# Patient Record
Sex: MALE | Race: BLACK OR AFRICAN AMERICAN | NOT HISPANIC OR LATINO | Employment: FULL TIME | URBAN - METROPOLITAN AREA
[De-identification: names, ages, dates, MRNs, and addresses within clinical notes are randomized per-mention and may not be internally consistent; named-entity substitution may affect disease eponyms.]

---

## 2021-01-22 ENCOUNTER — APPOINTMENT (OUTPATIENT)
Dept: GENERAL RADIOLOGY | Facility: CLINIC | Age: 41
End: 2021-01-22
Attending: INTERNAL MEDICINE
Payer: COMMERCIAL

## 2021-01-22 ENCOUNTER — HOSPITAL ENCOUNTER (EMERGENCY)
Facility: CLINIC | Age: 41
Discharge: HOME | End: 2021-01-23
Attending: INTERNAL MEDICINE | Admitting: INTERNAL MEDICINE
Payer: COMMERCIAL

## 2021-01-22 ENCOUNTER — NURSE TRIAGE (OUTPATIENT)
Dept: NURSING | Facility: CLINIC | Age: 41
End: 2021-01-22

## 2021-01-22 DIAGNOSIS — Z20.822 SUSPECTED COVID-19 VIRUS INFECTION: ICD-10-CM

## 2021-01-22 DIAGNOSIS — U07.1 PNEUMONIA DUE TO 2019 NOVEL CORONAVIRUS: ICD-10-CM

## 2021-01-22 DIAGNOSIS — J12.82 PNEUMONIA DUE TO 2019 NOVEL CORONAVIRUS: ICD-10-CM

## 2021-01-22 LAB
ALBUMIN SERPL-MCNC: 3.3 G/DL (ref 3.4–5)
ALP SERPL-CCNC: 100 U/L (ref 40–150)
ALT SERPL W P-5'-P-CCNC: 51 U/L (ref 0–70)
ANION GAP SERPL CALCULATED.3IONS-SCNC: 6 MMOL/L (ref 3–14)
AST SERPL W P-5'-P-CCNC: 63 U/L (ref 0–45)
BILIRUB SERPL-MCNC: 0.6 MG/DL (ref 0.2–1.3)
BUN SERPL-MCNC: 10 MG/DL (ref 7–30)
CALCIUM SERPL-MCNC: 8.6 MG/DL (ref 8.5–10.1)
CHLORIDE SERPL-SCNC: 101 MMOL/L (ref 94–109)
CO2 SERPL-SCNC: 29 MMOL/L (ref 20–32)
CREAT SERPL-MCNC: 1.12 MG/DL (ref 0.66–1.25)
CRP SERPL-MCNC: 36 MG/L (ref 0–8)
D DIMER PPP FEU-MCNC: 0.4 UG/ML FEU (ref 0–0.5)
GFR SERPL CREATININE-BSD FRML MDRD: 83 ML/MIN/{1.73_M2}
GLUCOSE SERPL-MCNC: 150 MG/DL (ref 70–99)
INR PPP: 1.03 (ref 0.86–1.14)
LACTATE BLD-SCNC: 2 MMOL/L (ref 0.7–2)
LIPASE SERPL-CCNC: 82 U/L (ref 73–393)
POTASSIUM SERPL-SCNC: 2.9 MMOL/L (ref 3.4–5.3)
PROT SERPL-MCNC: 7.2 G/DL (ref 6.8–8.8)
SODIUM SERPL-SCNC: 136 MMOL/L (ref 133–144)
TROPONIN I SERPL-MCNC: <0.015 UG/L (ref 0–0.04)

## 2021-01-22 PROCEDURE — C9803 HOPD COVID-19 SPEC COLLECT: HCPCS | Performed by: INTERNAL MEDICINE

## 2021-01-22 PROCEDURE — 83605 ASSAY OF LACTIC ACID: CPT | Performed by: INTERNAL MEDICINE

## 2021-01-22 PROCEDURE — 86140 C-REACTIVE PROTEIN: CPT | Performed by: INTERNAL MEDICINE

## 2021-01-22 PROCEDURE — 99285 EMERGENCY DEPT VISIT HI MDM: CPT | Mod: 25 | Performed by: INTERNAL MEDICINE

## 2021-01-22 PROCEDURE — 82728 ASSAY OF FERRITIN: CPT | Performed by: INTERNAL MEDICINE

## 2021-01-22 PROCEDURE — 93005 ELECTROCARDIOGRAM TRACING: CPT | Performed by: INTERNAL MEDICINE

## 2021-01-22 PROCEDURE — 84484 ASSAY OF TROPONIN QUANT: CPT | Performed by: INTERNAL MEDICINE

## 2021-01-22 PROCEDURE — 87636 SARSCOV2 & INF A&B AMP PRB: CPT | Performed by: INTERNAL MEDICINE

## 2021-01-22 PROCEDURE — 85379 FIBRIN DEGRADATION QUANT: CPT | Performed by: INTERNAL MEDICINE

## 2021-01-22 PROCEDURE — 80053 COMPREHEN METABOLIC PANEL: CPT | Performed by: INTERNAL MEDICINE

## 2021-01-22 PROCEDURE — 71045 X-RAY EXAM CHEST 1 VIEW: CPT

## 2021-01-22 PROCEDURE — 93010 ELECTROCARDIOGRAM REPORT: CPT | Performed by: INTERNAL MEDICINE

## 2021-01-22 PROCEDURE — 85025 COMPLETE CBC W/AUTO DIFF WBC: CPT | Performed by: INTERNAL MEDICINE

## 2021-01-22 PROCEDURE — 71045 X-RAY EXAM CHEST 1 VIEW: CPT | Mod: 26 | Performed by: RADIOLOGY

## 2021-01-22 PROCEDURE — 83690 ASSAY OF LIPASE: CPT | Performed by: INTERNAL MEDICINE

## 2021-01-22 PROCEDURE — 85610 PROTHROMBIN TIME: CPT | Performed by: INTERNAL MEDICINE

## 2021-01-22 RX ORDER — HYDROCHLOROTHIAZIDE 12.5 MG/1
TABLET ORAL DAILY
COMMUNITY
End: 2021-12-13

## 2021-01-22 RX ORDER — AMLODIPINE BESYLATE AND ATORVASTATIN CALCIUM 10; 10 MG/1; MG/1
1 TABLET, FILM COATED ORAL DAILY
COMMUNITY
End: 2021-12-16

## 2021-01-22 ASSESSMENT — ENCOUNTER SYMPTOMS
TROUBLE SWALLOWING: 0
SHORTNESS OF BREATH: 1
CHILLS: 1
COUGH: 1
FEVER: 1
ABDOMINAL PAIN: 0
NECK PAIN: 0
CONFUSION: 0
LIGHT-HEADEDNESS: 0
WHEEZING: 1
PALPITATIONS: 0
NAUSEA: 1
NUMBNESS: 0
VOMITING: 1
DIARRHEA: 0
ADENOPATHY: 0
HEADACHES: 0
BACK PAIN: 0
WEAKNESS: 0
DIFFICULTY URINATING: 0

## 2021-01-23 VITALS
RESPIRATION RATE: 22 BRPM | HEART RATE: 92 BPM | WEIGHT: 315 LBS | DIASTOLIC BLOOD PRESSURE: 69 MMHG | TEMPERATURE: 100.5 F | OXYGEN SATURATION: 94 % | SYSTOLIC BLOOD PRESSURE: 117 MMHG

## 2021-01-23 LAB
BASOPHILS # BLD AUTO: 0 10E9/L (ref 0–0.2)
BASOPHILS NFR BLD AUTO: 0.3 %
DIFFERENTIAL METHOD BLD: ABNORMAL
EOSINOPHIL # BLD AUTO: 0 10E9/L (ref 0–0.7)
EOSINOPHIL NFR BLD AUTO: 0 %
ERYTHROCYTE [DISTWIDTH] IN BLOOD BY AUTOMATED COUNT: 12.3 % (ref 10–15)
FERRITIN SERPL-MCNC: 280 NG/ML (ref 26–388)
FLUAV RNA RESP QL NAA+PROBE: NEGATIVE
FLUBV RNA RESP QL NAA+PROBE: NEGATIVE
HCT VFR BLD AUTO: 45.4 % (ref 40–53)
HGB BLD-MCNC: 14.6 G/DL (ref 13.3–17.7)
IMM GRANULOCYTES # BLD: 0 10E9/L (ref 0–0.4)
IMM GRANULOCYTES NFR BLD: 0.3 %
INTERPRETATION ECG - MUSE: NORMAL
LABORATORY COMMENT REPORT: ABNORMAL
LYMPHOCYTES # BLD AUTO: 0.9 10E9/L (ref 0.8–5.3)
LYMPHOCYTES NFR BLD AUTO: 24.3 %
MCH RBC QN AUTO: 26.5 PG (ref 26.5–33)
MCHC RBC AUTO-ENTMCNC: 32.2 G/DL (ref 31.5–36.5)
MCV RBC AUTO: 83 FL (ref 78–100)
MONOCYTES # BLD AUTO: 0.3 10E9/L (ref 0–1.3)
MONOCYTES NFR BLD AUTO: 9.4 %
NEUTROPHILS # BLD AUTO: 2.4 10E9/L (ref 1.6–8.3)
NEUTROPHILS NFR BLD AUTO: 65.7 %
NRBC # BLD AUTO: 0 10*3/UL
NRBC BLD AUTO-RTO: 0 /100
PLATELET # BLD AUTO: 169 10E9/L (ref 150–450)
PLATELET # BLD EST: ABNORMAL 10*3/UL
RBC # BLD AUTO: 5.5 10E12/L (ref 4.4–5.9)
RSV RNA SPEC QL NAA+PROBE: NEGATIVE
SARS-COV-2 RNA RESP QL NAA+PROBE: POSITIVE
SPECIMEN SOURCE: ABNORMAL
WBC # BLD AUTO: 3.6 10E9/L (ref 4–11)

## 2021-01-23 PROCEDURE — 96374 THER/PROPH/DIAG INJ IV PUSH: CPT | Performed by: INTERNAL MEDICINE

## 2021-01-23 PROCEDURE — 250N000011 HC RX IP 250 OP 636: Performed by: INTERNAL MEDICINE

## 2021-01-23 PROCEDURE — 258N000003 HC RX IP 258 OP 636: Performed by: INTERNAL MEDICINE

## 2021-01-23 PROCEDURE — 96361 HYDRATE IV INFUSION ADD-ON: CPT | Performed by: INTERNAL MEDICINE

## 2021-01-23 PROCEDURE — 250N000013 HC RX MED GY IP 250 OP 250 PS 637: Performed by: INTERNAL MEDICINE

## 2021-01-23 RX ORDER — ONDANSETRON 4 MG/1
4 TABLET, ORALLY DISINTEGRATING ORAL EVERY 8 HOURS PRN
Qty: 10 TABLET | Refills: 0 | Status: SHIPPED | OUTPATIENT
Start: 2021-01-23 | End: 2021-01-26

## 2021-01-23 RX ORDER — ONDANSETRON 2 MG/ML
4 INJECTION INTRAMUSCULAR; INTRAVENOUS ONCE
Status: COMPLETED | OUTPATIENT
Start: 2021-01-23 | End: 2021-01-23

## 2021-01-23 RX ORDER — ALBUTEROL SULFATE 90 UG/1
2 AEROSOL, METERED RESPIRATORY (INHALATION) EVERY 6 HOURS PRN
Qty: 1 INHALER | Refills: 0 | Status: SHIPPED | OUTPATIENT
Start: 2021-01-23 | End: 2022-11-09

## 2021-01-23 RX ORDER — SODIUM CHLORIDE 9 MG/ML
INJECTION, SOLUTION INTRAVENOUS CONTINUOUS
Status: DISCONTINUED | OUTPATIENT
Start: 2021-01-23 | End: 2021-01-23 | Stop reason: HOSPADM

## 2021-01-23 RX ORDER — POTASSIUM CHLORIDE 1.5 G/1.58G
20 POWDER, FOR SOLUTION ORAL ONCE
Status: COMPLETED | OUTPATIENT
Start: 2021-01-23 | End: 2021-01-23

## 2021-01-23 RX ADMIN — SODIUM CHLORIDE 1000 ML: 9 INJECTION, SOLUTION INTRAVENOUS at 02:01

## 2021-01-23 RX ADMIN — ONDANSETRON 4 MG: 2 INJECTION INTRAMUSCULAR; INTRAVENOUS at 02:02

## 2021-01-23 RX ADMIN — POTASSIUM CHLORIDE 20 MEQ: 1.5 POWDER, FOR SOLUTION ORAL at 01:56

## 2021-01-23 NOTE — DISCHARGE INSTRUCTIONS
Albuterol 2 puff 4 times/ day as needed for cough, wheezing.  Zofran 4 mg every 8 hours as needed for nausea.  Check your pulse oxygen at least 4 times/ day.   You will be contacted for outpatient follow up.   Return if your oxygen is less than 90%, you have chest pain, worsening shortness of breath or worsening illness.

## 2021-01-23 NOTE — TELEPHONE ENCOUNTER
Call received from spouse, Praveena You  Verbal consent from pt to speak to spouse.  Pt is present and speaking via speaker phone    Lionel has tested positive for Covid-19  Per Prvaeena, they were advised by another nurse line that he should go in to the ER due to reports of trouble breathing.    She wants to know what the protocol is for going to the ER.  She states the [facility] instructed them to ask about Covid protocols before seeking medical care with ER/EMS/HCP    Praveena thought that she was calling directly to the ER. Notified that I am not at the ER/hospital/clinic    Advised - Lionel can present to the ER, notify staff of difficulty breathing and of positive Covid-19 status.      Genet Lee RN  [facility]      Additional Information    Health Information question, no triage required and triager able to answer question    Protocols used: INFORMATION ONLY CALL-A-AH

## 2021-01-23 NOTE — ED PROVIDER NOTES
ED Provider Note  [facility]      History     Chief Complaint   Patient presents with     Shortness of Breath     Nausea & Vomiting     HPI  Lionel Espitia is a 40 year old male who presents with increasing shortness of breath. He had a positive Covid test on 1/14 (result not available in Epic). He presents with worsened shortness of breath, nausea and dyspnea over the past 2 days. He has had non productive cough for about 7-10 days. He had initial negative test at a community clinic, then had positive Covid saliva test on 1/14. He has non productive cough and post tussive emesis. He has had low grade fevers and chills. He has had a couple of episodes of nausea and vomiting without coughing. He has no chest pain, abdominal pain, diarrhea, leg pain or swelling.    He has a history of morbid obesity, asthma, hypertension,  shunt, impaired fasting blood sugar and cellulitis in his legs.    Past Medical History:   Diagnosis Date     Hypertension        History reviewed. No pertinent surgical history.    History reviewed. No pertinent family history.    Social History     Tobacco Use     Smoking status: Never Smoker     Smokeless tobacco: Never Used   Substance Use Topics     Alcohol use: Not Currently          Review of Systems   Constitutional: Positive for chills and fever.   HENT: Positive for congestion. Negative for trouble swallowing.    Eyes: Negative for visual disturbance.   Respiratory: Positive for cough, shortness of breath and wheezing.    Cardiovascular: Negative for chest pain, palpitations and leg swelling.   Gastrointestinal: Positive for nausea and vomiting. Negative for abdominal pain and diarrhea.   Genitourinary: Negative for difficulty urinating.   Musculoskeletal: Negative for back pain and neck pain.   Skin: Negative for rash.   Neurological: Negative for weakness, light-headedness, numbness and headaches.   Hematological: Negative for adenopathy.    Psychiatric/Behavioral: Negative for confusion.         Physical Exam   BP: 133/89  Pulse: 116  Temp: 100.5  F (38.1  C)  Resp: 22  Weight: (!) 156.5 kg (345 lb)  SpO2: 96 %  Physical Exam  Vitals signs and nursing note reviewed.   Constitutional:       Appearance: He is well-developed.   HENT:      Head: Normocephalic and atraumatic.      Mouth/Throat:      Mouth: Mucous membranes are moist.   Eyes:      Extraocular Movements: Extraocular movements intact.      Pupils: Pupils are equal, round, and reactive to light.   Neck:      Musculoskeletal: Normal range of motion and neck supple.   Cardiovascular:      Rate and Rhythm: Normal rate and regular rhythm.      Heart sounds: No murmur.   Pulmonary:      Effort: Pulmonary effort is normal.      Breath sounds: Normal breath sounds.   Abdominal:      General: Abdomen is flat.      Palpations: Abdomen is soft.      Tenderness: There is no abdominal tenderness. There is no guarding.   Musculoskeletal: Normal range of motion.         General: No swelling or tenderness.      Right lower leg: No edema.      Left lower leg: No edema.   Skin:     General: Skin is warm and dry.   Neurological:      General: No focal deficit present.      Mental Status: He is alert and oriented to person, place, and time.   Psychiatric:         Mood and Affect: Mood normal.         Behavior: Behavior normal.         Thought Content: Thought content normal.       ED Course      Procedures             EKG Interpretation:      Interpreted by APARNA HOOD MD  Time reviewed:2303  Symptoms at time of EKG: None   Rhythm: Normal sinus   Rate: Normal  Axis: Normal  Ectopy: None  Conduction: Normal and Right bundle branch block (complete)  ST Segments/ T Waves: Non-specific ST-T wave changes  Q Waves: None  Comparison to prior: No old EKG available    Clinical Impression: right bundle branch block      Labs/Imaging    Results for orders placed or performed during the hospital encounter of 01/22/21  (from the past 24 hour(s))   XR Chest Port 1 View    Narrative    EXAM: XR CHEST PORT 1 VW  LOCATION: [facility]  DATE/TIME: 1/22/2021 10:56 PM    INDICATION: Covid, dyspnea  COMPARISON: None.      Impression    IMPRESSION: Slight left greater than right basilar groundglass opacity, may reflect atelectasis versus consolidation, recommend follow-up to clearing. No pneumothorax or pleural effusion. Cardiac silhouette within normal limits.   CBC with platelets differential   Result Value Ref Range    WBC 3.6 (L) 4.0 - 11.0 10e9/L    RBC Count 5.50 4.4 - 5.9 10e12/L    Hemoglobin 14.6 13.3 - 17.7 g/dL    Hematocrit 45.4 40.0 - 53.0 %    MCV 83 78 - 100 fl    MCH 26.5 26.5 - 33.0 pg    MCHC 32.2 31.5 - 36.5 g/dL    RDW 12.3 10.0 - 15.0 %    Platelet Count 169 150 - 450 10e9/L    Diff Method PENDING    INR   Result Value Ref Range    INR 1.03 0.86 - 1.14   D dimer quantitative   Result Value Ref Range    D Dimer 0.4 0.0 - 0.50 ug/ml FEU   Comprehensive metabolic panel   Result Value Ref Range    Sodium 136 133 - 144 mmol/L    Potassium 2.9 (L) 3.4 - 5.3 mmol/L    Chloride 101 94 - 109 mmol/L    Carbon Dioxide 29 20 - 32 mmol/L    Anion Gap 6 3 - 14 mmol/L    Glucose 150 (H) 70 - 99 mg/dL    Urea Nitrogen 10 7 - 30 mg/dL    Creatinine 1.12 0.66 - 1.25 mg/dL    GFR Estimate 83 >60 mL/min/[1.73_m2]    GFR Estimate If Black >90 >60 mL/min/[1.73_m2]    Calcium 8.6 8.5 - 10.1 mg/dL    Bilirubin Total 0.6 0.2 - 1.3 mg/dL    Albumin 3.3 (L) 3.4 - 5.0 g/dL    Protein Total 7.2 6.8 - 8.8 g/dL    Alkaline Phosphatase 100 40 - 150 U/L    ALT 51 0 - 70 U/L    AST 63 (H) 0 - 45 U/L   Lipase   Result Value Ref Range    Lipase 82 73 - 393 U/L   Lactic acid whole blood   Result Value Ref Range    Lactic Acid 2.0 0.7 - 2.0 mmol/L   CRP inflammation   Result Value Ref Range    CRP Inflammation 36.0 (H) 0.0 - 8.0 mg/L   Troponin I   Result Value Ref Range    Troponin I ES <0.015 0.000 - 0.045 ug/L   Ferritin   Result Value Ref  Range    Ferritin 280 26 - 388 ng/mL   Symptomatic Influenza A/B & SARS-CoV2 (COVID-19) Virus PCR Multiplex    Specimen: Nasopharyngeal   Result Value Ref Range    Flu A/B & SARS-COV-2 PCR Source Nasopharyngeal     SARS-CoV-2 PCR Result POSITIVE (AA)     Influenza A PCR Negative NEG^Negative    Influenza B PCR Negative NEG^Negative    Respiratory Syncytial Virus PCR Negative NEG^Negative    Flu A/B & SARS-CoV-2 PCR Comment (Note)        Medications   0.9% sodium chloride BOLUS (1,000 mLs Intravenous New Bag 1/23/21 0201)     Followed by   sodium chloride 0.9% infusion (has no administration in time range)   potassium chloride (KLOR-CON) Packet 20 mEq (20 mEq Oral Given 1/23/21 0156)   ondansetron (ZOFRAN) injection 4 mg (4 mg Intravenous Given 1/23/21 0202)        Assessments & Plan (with Medical Decision Making)   Impression:  Young male with a history of hypertension, obesity and mild asthma presents with Covid-19 symptoms for the past 10 days. He has normal oxygen saturation on room air. Fevers have not been severe. He has non productive cough, but is in no respiratory distress. He has no wheezing on exam. CXR has mild bibasilar ground glass infiltrates consistent with Covid-19. He has mild leukopenia. CRP is moderately elevated. Lactate and ferritin are normal. He has mild hypokalemia and mild elevation of AST. At this point he does not appear to meet criteria for admission, treatment with steroids or remdesivir. He does not need antibiotics. He was given some IV hydration and will be discharged with albuterol inhaler, zofran and home pulse oximetry monitoring through the [facility].    I have reviewed the nursing notes. I have reviewed the findings, diagnosis, plan and need for follow up with the patient.    New Prescriptions    ALBUTEROL (PROAIR HFA/PROVENTIL HFA/VENTOLIN HFA) 108 (90 BASE) MCG/ACT INHALER    Inhale 2 puffs into the lungs every 6 hours as needed for shortness of breath / dyspnea or wheezing     ONDANSETRON (ZOFRAN ODT) 4 MG ODT TAB    Take 1 tablet (4 mg) by mouth every 8 hours as needed for nausea or vomiting       Final diagnoses:   Pneumonia due to 2019 novel coronavirus   Suspected COVID-19 virus infection       --  Alex Grossman  [facility] EMERGENCY DEPARTMENT  1/22/2021     Alex Grossman MD  01/23/21 9743

## 2021-01-23 NOTE — ED TRIAGE NOTES
Had a positive COVID test on the 1/14/21. SOB has been growing worse over last week, last two days pt has had increasing work of breathing, cough, and nausea.

## 2021-01-24 ENCOUNTER — PATIENT OUTREACH (OUTPATIENT)
Dept: CARE COORDINATION | Facility: CLINIC | Age: 41
End: 2021-01-24

## 2021-01-24 NOTE — TELEPHONE ENCOUNTER
Care Coordination ED Discharge Follow up Note-    Patient referred for Virtual Home Monitoring Program for COVID-19. Called patient to complete assessment, verify or assist with scheduling follow up appointment with patient's PCP, offer Clinic Care Coordination services and ensure patient is engaged with the Get[employer] Loop. Called patient and left detailed message stating a nurse will try him again tomorrow.

## 2021-01-25 ENCOUNTER — PATIENT OUTREACH (OUTPATIENT)
Dept: CARE COORDINATION | Facility: CLINIC | Age: 41
End: 2021-01-25

## 2021-01-25 NOTE — PROGRESS NOTES
Care Coordination ED Discharge Follow up Note  Pt on home virtual monitoring program for COVID-19, call made to do assessment, verify follow-up appt and offer care coordination, no answer. Left message asking pt to make appt for virtual visit in the next one to two days and to accept the invitation to participate in [facility] Loop. Also left message to eat K+ rich foods and suggestions given.

## 2021-01-27 ENCOUNTER — OFFICE VISIT - HEALTHEAST (OUTPATIENT)
Dept: FAMILY MEDICINE | Facility: CLINIC | Age: 41
End: 2021-01-27

## 2021-01-27 DIAGNOSIS — U07.1 COVID-19: ICD-10-CM

## 2021-01-27 ASSESSMENT — PATIENT HEALTH QUESTIONNAIRE - PHQ9: SUM OF ALL RESPONSES TO PHQ QUESTIONS 1-9: 0

## 2021-03-09 ENCOUNTER — OFFICE VISIT - HEALTHEAST (OUTPATIENT)
Dept: FAMILY MEDICINE | Facility: CLINIC | Age: 41
End: 2021-03-09

## 2021-03-09 DIAGNOSIS — E66.01 MORBID OBESITY (H): ICD-10-CM

## 2021-03-09 DIAGNOSIS — D33.2 BENIGN NEOPLASM OF BRAIN, UNSPECIFIED BRAIN REGION (H): ICD-10-CM

## 2021-03-09 DIAGNOSIS — R53.83 FATIGUE, UNSPECIFIED TYPE: ICD-10-CM

## 2021-03-09 DIAGNOSIS — R73.01 IMPAIRED FASTING BLOOD SUGAR: ICD-10-CM

## 2021-03-09 DIAGNOSIS — I10 ESSENTIAL HYPERTENSION: ICD-10-CM

## 2021-03-09 LAB
ALBUMIN UR-MCNC: NEGATIVE G/DL
APPEARANCE UR: CLEAR
BILIRUB UR QL STRIP: NEGATIVE
COLOR UR AUTO: YELLOW
GLUCOSE UR STRIP-MCNC: NEGATIVE MG/DL
HGB UR QL STRIP: NEGATIVE
KETONES UR STRIP-MCNC: NEGATIVE MG/DL
LEUKOCYTE ESTERASE UR QL STRIP: NEGATIVE
NITRATE UR QL: NEGATIVE
PH UR STRIP: 6.5 [PH] (ref 5–8)
SP GR UR STRIP: 1.02 (ref 1–1.03)
UROBILINOGEN UR STRIP-ACNC: NORMAL

## 2021-03-09 ASSESSMENT — MIFFLIN-ST. JEOR: SCORE: 2535.39

## 2021-03-10 ENCOUNTER — AMBULATORY - HEALTHEAST (OUTPATIENT)
Dept: LAB | Facility: CLINIC | Age: 41
End: 2021-03-10

## 2021-03-10 DIAGNOSIS — R53.83 FATIGUE, UNSPECIFIED TYPE: ICD-10-CM

## 2021-03-10 DIAGNOSIS — E66.01 MORBID OBESITY (H): ICD-10-CM

## 2021-03-10 DIAGNOSIS — I10 ESSENTIAL HYPERTENSION: ICD-10-CM

## 2021-03-10 DIAGNOSIS — R73.01 IMPAIRED FASTING BLOOD SUGAR: ICD-10-CM

## 2021-03-10 LAB
ANION GAP SERPL CALCULATED.3IONS-SCNC: 9 MMOL/L (ref 5–18)
BUN SERPL-MCNC: 10 MG/DL (ref 8–22)
CALCIUM SERPL-MCNC: 9.6 MG/DL (ref 8.5–10.5)
CHLORIDE BLD-SCNC: 103 MMOL/L (ref 98–107)
CHOLEST SERPL-MCNC: 158 MG/DL
CO2 SERPL-SCNC: 29 MMOL/L (ref 22–31)
CREAT SERPL-MCNC: 0.95 MG/DL (ref 0.7–1.3)
ERYTHROCYTE [DISTWIDTH] IN BLOOD BY AUTOMATED COUNT: 12.9 % (ref 11–14.5)
FASTING STATUS PATIENT QL REPORTED: YES
GFR SERPL CREATININE-BSD FRML MDRD: >60 ML/MIN/1.73M2
GLUCOSE BLD-MCNC: 102 MG/DL (ref 70–125)
HBA1C MFR BLD: 6 %
HCT VFR BLD AUTO: 43.1 % (ref 40–54)
HDLC SERPL-MCNC: 45 MG/DL
HGB BLD-MCNC: 13.9 G/DL (ref 14–18)
LDLC SERPL CALC-MCNC: 102 MG/DL
MCH RBC QN AUTO: 27 PG (ref 27–34)
MCHC RBC AUTO-ENTMCNC: 32.3 G/DL (ref 32–36)
MCV RBC AUTO: 84 FL (ref 80–100)
PLATELET # BLD AUTO: 248 THOU/UL (ref 140–440)
PMV BLD AUTO: 9.9 FL (ref 7–10)
POTASSIUM BLD-SCNC: 4 MMOL/L (ref 3.5–5)
RBC # BLD AUTO: 5.15 MILL/UL (ref 4.4–6.2)
SODIUM SERPL-SCNC: 141 MMOL/L (ref 136–145)
TRIGL SERPL-MCNC: 54 MG/DL
TSH SERPL DL<=0.005 MIU/L-ACNC: 1.27 UIU/ML (ref 0.3–5)
VIT B12 SERPL-MCNC: 1015 PG/ML (ref 213–816)
WBC: 3.3 THOU/UL (ref 4–11)

## 2021-05-27 ASSESSMENT — PATIENT HEALTH QUESTIONNAIRE - PHQ9: SUM OF ALL RESPONSES TO PHQ QUESTIONS 1-9: 0

## 2021-06-05 VITALS
WEIGHT: 315 LBS | BODY MASS INDEX: 41.75 KG/M2 | SYSTOLIC BLOOD PRESSURE: 123 MMHG | HEART RATE: 98 BPM | HEIGHT: 73 IN | DIASTOLIC BLOOD PRESSURE: 86 MMHG

## 2021-06-08 ENCOUNTER — RECORDS - HEALTHEAST (OUTPATIENT)
Dept: GENERAL RADIOLOGY | Facility: CLINIC | Age: 41
End: 2021-06-08

## 2021-06-08 ENCOUNTER — OFFICE VISIT - HEALTHEAST (OUTPATIENT)
Dept: FAMILY MEDICINE | Facility: CLINIC | Age: 41
End: 2021-06-08

## 2021-06-08 DIAGNOSIS — D33.2 BENIGN NEOPLASM OF BRAIN, UNSPECIFIED BRAIN REGION (H): ICD-10-CM

## 2021-06-08 DIAGNOSIS — M25.571 PAIN IN RIGHT ANKLE AND JOINTS OF RIGHT FOOT: ICD-10-CM

## 2021-06-08 DIAGNOSIS — G25.9 LESION OF BASAL GANGLIA: ICD-10-CM

## 2021-06-08 DIAGNOSIS — M25.571 PAIN IN JOINT INVOLVING ANKLE AND FOOT, RIGHT: ICD-10-CM

## 2021-06-08 DIAGNOSIS — E66.01 MORBID OBESITY (H): ICD-10-CM

## 2021-06-10 ENCOUNTER — OFFICE VISIT - HEALTHEAST (OUTPATIENT)
Dept: PHYSICAL THERAPY | Facility: REHABILITATION | Age: 41
End: 2021-06-10

## 2021-06-10 DIAGNOSIS — M25.671 ANKLE STIFFNESS, RIGHT: ICD-10-CM

## 2021-06-10 DIAGNOSIS — R26.2 DIFFICULTY WALKING: ICD-10-CM

## 2021-06-10 DIAGNOSIS — M25.571 ACUTE RIGHT ANKLE PAIN: ICD-10-CM

## 2021-06-11 ENCOUNTER — TRANSCRIBE ORDERS (OUTPATIENT)
Dept: OTHER | Age: 41
End: 2021-06-11

## 2021-06-11 DIAGNOSIS — D33.2 BENIGN NEOPLASM OF BRAIN, UNSPECIFIED BRAIN REGION (H): Primary | ICD-10-CM

## 2021-06-11 DIAGNOSIS — G25.9 LESION OF BASAL GANGLIA: ICD-10-CM

## 2021-06-14 NOTE — PROGRESS NOTES
Lionel Espitia Jr. is a 40 y.o. male who is being evaluated via a billable telephone visit.      What phone number would you like to be contacted at? [phone]   How would you like to obtain your AVS? AVS Preference: Cynthia.     Assessment & Plan     COVID-19  Discussed clinical course.  This is more protracted, but I am hopeful that he is turning a corner.  Continue to watch sats and let seek care if <90.  Can try Tessalon for cough, but discussed that cough is hard to treat.  Can continue NSAIDs.  I don't think he needs anti coag or further imaging at this time.  Would consider if he worsens.    - benzonatate (TESSALON PERLES) 100 MG capsule  Dispense: 30 capsule; Refill: 0      Return in about 1 month (around 2/27/2021) for Annual physical, Recheck.    Tashi Sin MD  [facility]    Subjective     Lionel Espitia Jr. is 40 y.o. and presents to clinic today for the following health issues   HPI   Lingering effects from COVID.  Tested + on 1/14/2021  ER note from 1/22 [facility] reviewed.  CXR with bilateral GGO.  D-dimer normal.  Low K+    Had IV fluids, K+ replacement, ondansetron.    Generally a little better.    Some shortness of breath with walking around house.  Sats remain in mid 90s.    Stomach pain  Lower appetite  No chest pain  From coughing--taking ibuprofen and tylenol  Doesn't feel like he gets much assist from either.    Taste and smell have always been normal.  No myalgia or arthralgia.    Hard to rest.  Two young children at home.    Objective       Vitals:  No vitals were obtained today due to virtual visit.      Start: 10:07  End: 10:30    Phone call duration: 23 minutes

## 2021-06-15 NOTE — PATIENT INSTRUCTIONS - HE
Lionel,  Nice to see you, today.  Dr Peña is the neurosurgeon at the  that I was talking about.  You should have the phone # to their office in the mychart summary of this visit.    We'll see you for labs tomorrow.  Dr NICK

## 2021-06-15 NOTE — PROGRESS NOTES
Adult Physical:    CC:  cpe    HPI:  40 y.o.   Low energy.  Always tired.  COVID side effect?  Has nearly 1 yo--not sleeping well.    Six hours per night is his usual.  Snores.  Not falling asleep watching TV or at stop lights.    Some testicular pain.  Hx vasectomy.  L>R.  No discharge.  Stable marriage low risk for STI.    Occasional odd urine flow.  Maybe incomplete emptying  Sometimes feels he needs to go back but gets only drops out.  Not painful.    Patient Active Problem List   Diagnosis     Morbid obesity (H)     Primary insomnia     Brain tumor (benign) (H)     Essential hypertension     Impaired fasting blood sugar     Lesion of basal ganglia     Last MRI 2017 [facility].  Has not had much for follow up.    Past Medical History:  History reviewed. No pertinent past medical history.      Brain tumor (benign) ([facility]) 5/14/1993   Brain surgery in the past and  shunt     Past Surgical History:  Past Surgical History:   Procedure Laterality Date     VASECTOMY       VENTRICULOPERITONEAL SHUNT  05/14/1993     Medicines:  Error in meds  Never on atorvastatin  Just amlodipine.  Outpatient Medications Prior to Visit   Medication Sig Dispense Refill     albuterol (VENTOLIN HFA) 90 mcg/actuation inhaler Ventolin HFA 90 mcg/actuation aerosol inhaler       hydroCHLOROthiazide (HYDRODIURIL) 25 MG tablet hydrochlorothiazide 25 mg tablet       amLODIPine-atorvastatin (CADUET) 10-10 mg per tablet Take 1 tablet by mouth. Take 1 tablet by mouth.       benzonatate (TESSALON PERLES) 100 MG capsule Take 1 capsule (100 mg total) by mouth 3 (three) times a day as needed for cough. 30 capsule 0     No facility-administered medications prior to visit.        Allergies:  Allergies   Allergen Reactions     Lisinopril Cough and Other (See Comments)     Coughing          Family History:  No family history on file.    Social History:  Social History     Socioeconomic History     Marital status:      Spouse name: Not on file  "    Number of children: Not on file     Years of education: Not on file     Highest education level: Not on file   Occupational History     Not on file   Social Needs     Financial resource strain: Not on file     Food insecurity     Worry: Not on file     Inability: Not on file     Transportation needs     Medical: Not on file     Non-medical: Not on file   Tobacco Use     Smoking status: Never Smoker     Smokeless tobacco: Never Used   Substance and Sexual Activity     Alcohol use: Not on file     Drug use: Not on file     Sexual activity: Not on file   Lifestyle     Physical activity     Days per week: Not on file     Minutes per session: Not on file     Stress: Not on file   Relationships     Social connections     Talks on phone: Not on file     Gets together: Not on file     Attends [employer] service: Not on file     Active member of club or organization: Not on file     Attends meetings of clubs or organizations: Not on file     Relationship status: Not on file     Intimate partner violence     Fear of current or ex partner: Not on file     Emotionally abused: Not on file     Physically abused: Not on file     Forced sexual activity: Not on file   Other Topics Concern     Not on file   Social History Narrative     Not on file     Review of Systems:  See HPI for positives    GEN: no fevers or chills   ENT: no sinus concerns, normal hearing and vision   RESP: no cough or wheezing   CV: no dyspnea, palpitations, edema or chest pain   GI: no nausea, vomiting, diarrhea, or constipation   : no dysuria or frequency   ENDO: no hot or cold intolerance, no polydipsia or polyuria   MS: no myalgias or arthralgias   DERM: no rash or bruising   PSYCH: no depression concerns     Physical Exam:  /86 (Patient Site: Right Arm, Patient Position: Sitting, Cuff Size: Thigh)   Pulse 98   Ht 6' 0.5\" (1.842 m)   Wt (!) 346 lb (156.9 kg)   BMI 46.28 kg/m    Gen:   Alert, not distressed  Head:   Normocephalic, without " obvious abnormality, atraumatic  Eyes:   PERRL, conjunctiva/corneas clear, EOM's intact  Ears:   Normal tympanic membranes and external ear canals  Nose:    Mucosa normal, no drainage or sinus tenderness  Throat:    No erythema or exudates  Neck:    No adenopathy no nodules in thyroid, normal ROM  Lungs:    Clear to auscultation bilaterally, respirations unlabored  Chest wall:  No tenderness or deformity  Heart:     Regular, normal S1 and S2, no murmur, gallop or rub  Abdomen:  Soft, non-tender, no masses, no organomegaly  Back:    Symmetric, no curvature, ROM normal, no CVA tenderness  Genitalia:    circumcisec phallus, no tenderness left vas and neurovascular bundle, no mass.  Extremities: Extremities normal, atraumatic, no cyanosis or edema  Skin:   Skin color, texture, turgor normal, no rashes or lesions  Lymph nodes: Cervical, and supraclavicular, nodes normal  Neurologic: CNII-XII intact.   DTRs normal and symmetric.  Symmetric strength and sensation.    Immunizations Due:  FLU: he declined.    Tobacco Cessation:   n/a    Weight management:   The patient was counseled regarding nutrition and physical activity    Assessment and Plan:  1. Essential hypertension  Controlled, continue meds HCTZ and amlodipine  - amLODIPine (NORVASC) 10 MG tablet; Take 1 tablet (10 mg total) by mouth daily.  Dispense: 90 tablet; Refill: 0  - Urinalysis  - Basic Metabolic Panel; Future    2. Benign neoplasm of brain, unspecified brain region (H)  Has not had much for follow up.  recommend involving NS for at least a check in.   shunt seems to be functioning well  - Ambulatory referral to Neurosurgery - [facility] Vivian Cole ([place])    3. Impaired fasting blood sugar  - Glycosylated Hemoglobin A1c; Future    4. Fatigue, unspecified type  Consider MANAN, as he doesn't wake refreshed.  primary lack of sleep time due to parenthood  Other metabolic conditions.  - Thyroid [place]; Future  - HM2(CBC w/o Differential); Future  -  Vitamin B12; Future    5. Morbid obesity (H)  Exercise and weight loss encrouaged  - Lipid [place] FASTING; Future

## 2021-06-16 PROBLEM — F51.01 PRIMARY INSOMNIA: Status: ACTIVE | Noted: 2017-12-06

## 2021-06-16 PROBLEM — G25.9: Status: ACTIVE | Noted: 2017-12-06

## 2021-06-17 ENCOUNTER — TELEPHONE (OUTPATIENT)
Dept: NEUROSURGERY | Facility: CLINIC | Age: 41
End: 2021-06-17

## 2021-06-18 NOTE — PATIENT INSTRUCTIONS - HE
Patient Instructions by Tashi Sin MD at 1/27/2021  9:40 AM     Author: Tashi Sin MD Service: -- Author Type: Physician    Filed: 1/27/2021 11:01 AM Encounter Date: 1/27/2021 Status: Signed    : Tashi Sin MD (Physician)       Patient Education     Coronavirus Disease 2019 (COVID-19): Caring for Yourself or Others   If you or a household member have symptoms of COVID-19, follow the guidelines below. This will help you manage symptoms and keep the virus from spreading.  If you have symptoms of COVID-19    Stay home and contact your care team. They will tell you what to do. You may be told to stay home and away from others (self-isolate). You may also be told to stay at least 6 feet from others (social distancing).    Stay away from work, school, and public places.    Limit physical contact with others in your home. Limit visitors. No kissing.  Clean surfaces you touch with disinfectant.  If you need to cough or sneeze, do it into a tissue. Then throw the tissue into the trash. If you don't have tissues, cough or sneeze into the bend of your elbow.  Dont share food or personal items with people in your household. This includes items like eating and drinking utensils, towels, and bedding.  Wear a cloth face mask around other people. During a public health emergency, medical face masks may be reserved for healthcare workers. You may need to make a cloth face mask of your own. You can do this using a bandana, T-shirt, or other cloth. The CDC has instructions on how to make a face mask. Wear the mask so that it covers both your nose and mouth.  If you need to go to a hospital or clinic, call ahead to let them know. Expect the care team to wear masks, gowns, gloves, and eye protection. You may need to come to a different entrance or wait in a separate room.  Follow all instructions from your care team.    If youve been diagnosed with COVID-19    Stay home and away from others, including other  people in your home. (This is called self-isolation.) Dont leave home unless you need to get medical care. Dont go to work, school, or public places. Dont use buses, taxis, or other public transportation.    Follow all instructions from your care team.    If you need to go to a hospital or clinic, call ahead to let them know. Expect the care team to wear masks, gowns, gloves, and eye protection. You may need to come to a different entrance or wait in a separate room.    Wear a face mask over your nose and mouth. This is to protect others from your germs. If you cant wear a mask, your caregivers should wear one. You may need to make your own mask using a bandana, T-shirt, or other cloth. See the [facility] instructions on how to make a face mask.    Have no contact with pets and other animals.    Dont share food or personal items with people in your household. This includes items like eating and drinking utensils, towels, and bedding.    If you need to cough or sneeze, do it into a tissue. Then throw the tissue into the trash. If you don't have tissues, cough or sneeze into the bend of your elbow.    Wash your hands often.    Self-care at home  The FDA has approved an antiviral medicine (called remdesivir) for people being treated in the hospital. This is for people 12 years and older who weigh more than about 88 pounds (40 kgs). In certain cases, it may also be used for people younger than 12 years or who weigh less than about 88 pounds (40 kgs)..  Currently, treatment is mostly aimed at helping your body while it fights the virus.    Getting extra rest.    Drink extra fluids 6 to 8 glasses of liquids each day), unless a doctor has told you not to. Ask your care team which fluids are best for you. Avoid fluids that contain caffeine or alcohol.    Taking over-the-counter (OTC) medicine to reduce pain and fever. Your care team will tell you which medicine to use.  If youve been in the hospital for COVID-19, follow your care  teams instructions. They will tell you when to stop self-isolation. They may also have you change positions to help your breathing (such as lying on your belly).  If a test showed that you have COVID-19, you may be asked to donate plasma after youve recovered. (This is called COVID-19 convalescent plasma donation.) The plasma may contain antibodies to help fight the virus in others. Experts don't know the safety of plasma or how well it works. Research continues, and the FDA has approved it for emergency use in certain people with serious or life-threatening COVID-19.  Caring for a sick person     Follow all instructions from the care team.    Wash your hands often.    Wear protective clothing as advised.    Make sure the sick person wears a mask. If they can't wear a mask, dont stay in the same room with them. If you must be in the same room, wear a face mask. Make sure the mask covers both the nose and mouth.    Keep track of the sick persons symptoms.    Clean surfaces often with disinfectant. This includes phones, kitchen counters, fridge door handles, bathroom surfaces, and others.    Dont let anyone share household items with the sick person. This includes eating and drinking tools, towels, sheets, or blankets.    Clean fabrics and laundry well.    Keep other people and pets away from the sick person.    When you can stop self-isolation  When you are sick with COVID-19, you should stay away from other people. This is called self-isolation. The rules for ending self-isolation depend on your health in general.  If you are normally healthy:  You can stop self-isolation when all 3 of these are true:    Youve had no fever--and no medicine that reduces fever--for at least 24 hours. And?    Your symptoms are better, such as cough or trouble breathing. And?    At least 10 days have passed since your symptoms first started.  Talk with your care team before you leave home. They may tell you its okay to leave, or they  may give you different advice. You do not need to be re-tested.  If you have a weak immune system, or youve had severe COVID-19:  Follow your care teams instructions. You may be asked to self-isolate for 10 days to 20 days after your symptoms first started. Your care team may want to re-test you for COVID-19.  Note: If youre being treated for cancer, have an immune disorder (such as HIV), or have had a transplant (organ or bone marrow), you may have a weak immune system.  If you've already had COVID-19 once:  If you had the virus over 3 months ago, and youve been exposed again, treat it like you've never had COVID-19. Stay home, limit your contact with others, call your care team, and watch for symptoms.  If its been less than 3 months since you had the virus, youre symptom-free, and youve been exposed again: You dont need to self-isolate. You dont need to be re-tested, unless you have new symptoms of COVID-19 that cant be linked to another illness. But if you develop new symptoms, stay home. Contact your care team if you have questions.  When you return to public settings  When you are well enough to go outside your home, follow the CDCs guidance on cloth face masks.    Anyone over age 2 should wear a face mask in public, especially when it's hard to socially distance. This includes public transit, public protests and marches, and crowded stores, bars, and restaurants.    Face masks are most likely to reduce the spread of COVID-19 when they are widely used by people who are out in the public.  Certain people should not wear a face covering. These include:    Children under 2 years old    Anyone with a health, developmental, or mental health condition that can be made worse by wearing a mask    Anyone who is unconscious or unable to remove the face covering without help. See the CDC's guidance on who should not wear a face mask.  When to call your care team  Call your care team right away if a sick person has any of  these:    Trouble breathing    Pain or pressure in chest  If a sick person has any of these, call 911:    Trouble breathing that gets worse    Pain or pressure in chest that gets worse    Blue tint to lips or face    Fast or irregular heartbeat    Confusion or trouble waking    Fainting or loss of consciousness    Coughing up blood  Going home from the hospital   If you have COVID-19 and were recently in the hospital:    Follow the instructions above for self-care and isolation.    Follow the hospital care teams instructions.    Ask questions if anything is unclear to you. Write down answers so you remember them.  Date last modified: 1/15/2021  [employer] last reviewed this educational content on 4/1/2020  This information has been modified by your health care provider with permission from the publisher.    4002-0908 [employer]. [address] [place] [address], [place] [address]. All rights reserved. This information is not intended as a substitute for professional medical care. Always follow your healthcare professional's instructions.    Patient Education     COVID-19: Lying in a Prone Position (Proning)  When you have COVID-19, lying on your belly can help your lungs work better. It can help get more oxygen into your lungs more easily. It can help prevent lung injury. Lying on your belly is known as the prone position. You may also hear it called proning. If youre in the hospital, the healthcare team may position you to help your lungs. Once you are strong enough, your healthcare team may want you to do these position changes on your own.  How does proning help you breathe?  Most of your lung tissue sits closer to your back than to your front. Lying on your back (supine) can put pressure on your lung tissue. This can make the small air sacs in your lungs need to work harder to inflate. If you have to breathe harder to get enough air in your lungs, this can make lung problems worse. It can also cause lung  injury.  But lying on your stomach (prone) can help your lungs work better with less stress. It can help prevent problems such as collapsed lung. This is when the air sacs in the lung cant inflate, or they may fill with fluid. It can happen to part or all of one or both lungs. Lying on your side can also help your lungs work better. Part of your time in bed will be spent on your side as well.  If you're in the hospital  If youre awake and still in the hospital, the healthcare team will help position you if needed. They can show you the safest ways to turn over while youre connected to tubes such as an IV. Your blood oxygen level (oxygen saturation, or O2 sat) may be checked often. This is to make sure your lungs are getting enough oxygen into your body. Your O2 sat level may be checked after each time you change position.  Getting ready for proning at home  Before you do prone positioning at home, tell your healthcare provider if you have any of these:    Neck, spine, or pelvic pain or other problems    Acid reflux from your stomach (heartburn or GERD)    Nausea or vomiting  Youll need:    A bed surface that can be flat    Pillows    A towel you can roll up  Before you get into bed:    Use the bathroom. This is so you dont have to get up soon after youre lying down.    Make sure you have things in reach that you need. This includes your phone, TV remote, book or magazine, or a bell to ring for a family member.  Changing positions over time  You will need to cycle through these positions. Repeat this cycle as often as your healthcare team advises. Do them in this order:  Proning cycle       Position 1: Belly. Lie on your belly on a flat bed. Do this for 30 minutes to 2 hours.       Position 2: Right side. Lie on your right side on a flat bed. Do this for 30 minutes to 2 hours.         Position 3: Sitting up. Sit up in bed at a 30- to 60-degree angle. You can prop up the head of your bed with blocks, or prop yourself  up in bed with pillows. Do this for 30 minutes to 2 hours.       Position 4: Left side. Lie on your left side on a flat bed. Do this for 30 minutes to 2 hours.         Position 5: Belly. Lie on your belly on a flat bed. Do this for 30 minutes to 2 hours.        Making proning more comfortable for you    Place pillows under your hips or lower legs for comfort as needed. If you have large breasts or a large belly, you can place pillows on your upper chest and pelvis to help support these areas while prone.    Put a pillow under your head. Turn your head from side to side at least once every 30 minutes, or more often as needed.    If you have neck problems, you can fold a towel into a horseshoe shape to support your face. This will let you lie face down without turning your head to the side.    Try putting your arms in different positions to see what is most comfortable. To start, try putting 1 arm bent and the other straight.    Dont lie on your stomach if youve just had a meal. This can cause stomach acid reflux. Try to wait a couple of hours after eating before you lie on your belly.    Change position slowly and carefully. If you have an IV or other tubes, make sure to not pull on them.    Change position more often if you are at risk for pressure sores (ulcers).    Follow your healthcare team's instructions  The information above is a guideline. Make sure to follow your healthcare teams specific instructions. They may tell you:    When to do proning    How often to do proning    How often to change position  When to call your healthcare provider  Call your healthcare provider if you have any of these:    Breathing that gets worse    New or worse neck, spine, or pelvic pain from proning    New or worse acid reflux    New chest pain  [employer] last reviewed this educational content on 6/1/2020 2000-2020 [employer]. [address] [place] [address], [place] [address]. All rights reserved. This information is  not intended as a substitute for professional medical care. Always follow your healthcare professional's instructions.

## 2021-06-21 NOTE — LETTER
Letter by Tashi Sin MD at      Author: Tashi Sin MD Service: -- Author Type: --    Filed:  Encounter Date: 1/27/2021 Status: (Other)         January 27, 2021     Patient: Lionel Espitia Jr.   YOB: 1980   Date of Visit: 1/27/2021       To Whom It May Concern:    I had a clinic with Mr. Lionel Espitia, today.    He is still dealing with COVID and symptomatic and not able to return to work.    If you have any questions or concerns, please don't hesitate to call.    Sincerely,        Electronically signed by Tashi Sin MD   1/27/2021, 10:28 AM

## 2021-06-26 NOTE — PROGRESS NOTES
Subjective:  40 y.o. male with concerns of follow-up.  Blood pressure is a bit elevated.  Has been taking ibuprofen due to foot pain.    No injury that caused foot pain.  Previous ankle fracture.  No swelling.  Has pain in plantar surface midfoot.    Patient also requests referral to neurosurgery again.  Would like follow-up on his right basal ganglia lesion and  shunt.    Patient considering weight loss surgery.  Would like referral to weight loss center.  May not want to pursue surgery but may try the medical side program first.    Outpatient Medications Prior to Visit   Medication Sig Dispense Refill     albuterol (VENTOLIN HFA) 90 mcg/actuation inhaler Ventolin HFA 90 mcg/actuation aerosol inhaler       amLODIPine (NORVASC) 10 MG tablet Take 1 tablet (10 mg total) by mouth daily. 90 tablet 0     hydroCHLOROthiazide (HYDRODIURIL) 25 MG tablet hydrochlorothiazide 25 mg tablet       No facility-administered medications prior to visit.       Social History     Tobacco Use   Smoking Status Never Smoker   Smokeless Tobacco Never Used      Objective:  BP (!) 149/98 (Patient Site: Right Arm, Patient Position: Sitting, Cuff Size: Thigh)   Pulse 76   Temp 97.6  F (36.4  C) (Other)   Wt (!) 356 lb (161.5 kg)   BMI 47.62 kg/m    GENERAL: alert, not distressed  CHEST: clear, no rales, rhonchi, or wheezes  CARDIAC: regular without murmur, gallop, or rub  ABDOMEN: soft, non tender, non distended, normal bowel sounds    Right foot:  Some pain in metatarsal arch area.  Circulatory, motor, and sensation are all intact.  Has ankle pronation and pes planus.    X-ray ankle:    Reviewed today.  Radiology report below.  The previously identified distal fibular diaphyseal fracture has healed completely. Abundant callus formation at the fracture site abuts the adjacent distal tibial metadiaphyseal cortex forming pseudoarticulation at this site. There is an ankle joint effusion which was also present on prior film from 2010 and may  be chronic. No acute fractures. Stable normal alignment.    Assessment and Plan:  1. Morbid obesity (H)  - Ambulatory referral to Bariatric Care: Surgical and Non-Surgical    2. Benign neoplasm of brain, unspecified brain region (H)  3. Lesion of basal ganglia  For back for maintenance visit.  - Ambulatory referral to Neurosurgery - [facility] Vivian Cole ([place])    4. Pain in joint involving ankle and foot, right  Continued ice and ibuprofen.  Consider more supportive footwear given pes planus may need arch support.  - Ambulatory referral to PT/OT    Blood pressure evaded today.  Could be result of NSAID use or acute pain from foot pain.  We will need to follow.    Consider sleep evaluation as well

## 2021-06-26 NOTE — PROGRESS NOTES
[facility]  Foot/Ankle Initial Evaluation    Patient Name: Lionel Espitia Jr.  Date of evaluation: 6/10/2021  Visit #1/12  Referral Diagnosis: Pain in joint involving ankle and foot, right  Referring provider: Tashi Sin MD  Visit Diagnosis:     ICD-10-CM    1. Acute right ankle pain  M25.571    2. Difficulty walking  R26.2    3. Ankle stiffness, right  M25.671        Assessment:     Lionel Espitia Jr. is a 40 y.o. male who presents to therapy today with chief complaints of (R) lat ankle pain, plantar foot pain. Onset date of sx was 6/3/21.  Functional impairments include weight bearing, standing, walking, sleeping, stairs.  Clinical findings include pes planus, antalgic gait, ankle effusion, decreased ankle ROM and strength with pain, tenderness of lat ankle, lat lower leg, plantar foot.        Pt. is appropriate for skilled PT intervention as outlined in the Plan of Care (POC).  Pt. is a good candidate for skilled PT services to improve pain levels and function.    Goals:  Pt. will demonstrate/verbalize independence in self-management of condition in : 6 weeks  Pt. will be independent with home exercise program in : 6 weeks  Pt. will have improved quality of sleep: with less pain;in 6 weeks;Comment  Comment:: decrease ankle pain by 50%  Pt. will be able to walk : 30 minutes;on even surfaces;for household mobility;for community mobility;in 6 weeks  Patient will stand : 30 minutes;with less difficultty;with less pain;for work;for home chores;in 6 weeks    No data recorded    Barriers to Learning or Achieving Goals:  No Barriers.    Patient's expectations/goals are realistic.       Plan / Patient Instructions:        Plan of Care:   Authorization / Certification Number of Visits: [facility]  Communication with: Referral Source  Patient Related Instruction: Nature of Condition;Treatment plan and rationale;Self Care instruction;Basis of treatment;Posture;Next steps  Times per Week: 1-2  Number of  Weeks: 6-12  Number of Visits: up to 12 PRN  Discharge Planning: HEP, self management  Precautions / Restrictions : none  Therapeutic Exercise: ROM;Stretching;Strengthening  Neuromuscular Reeducation: balance/proprioception;kinesio tape  Manual Therapy: myofascial release;strain counterstrain;joint mobilization      POC and pathology of condition were reviewed with patient.  Pt. is in agreement with the Plan of Care  A Home Exercise Program (HEP) was initiated today.    Plan for next visit: manual therapy, progression of home program, kinesio tape     Subjective:         Social information:   Living Situation:lives with others    Occupation:AV tech    Work Status:Working full time   Equipment Available: None    History of Present Illness:    Lionel is a 40 y.o. male who presents to therapy today with complaints of (R) plantar, foot, lat/post ankle pain. Pain can go up the leg.  Date of onset/duration of symptoms is 6/3/21. Onset was sudden and insidious. He woke with pain one morning, unable to weight bear. WB and walking are very painful. Symptoms are constant. He denies history of similar symptoms. He describes their previous level of function as not limited  Hx of (R) fibular fx about 10 years ago.     Pain Ratin  Pain rating at best: 6  Pain rating at worst: 9  Pain description: burning, pain and sharp    Functional limitations are described as occurring with:   ascending and descending stairs or curbs  sitting 2 min  sleeping  standing 0 min  walking 0 min  work walking, lifting  waking 4-5x/night    Patient reports benefit from:  rest       Imaging results per Epic:  EXAM: XR ANKLE RIGHT 3 OR MORE VWS  LOCATION: [facility]  DATE/TIME: 2021 8:33 AM     INDICATION: pain  COMPARISON: 3/16/2010     IMPRESSION:   The previously identified distal fibular diaphyseal fracture has healed completely. Abundant callus formation at the fracture site abuts the adjacent distal tibial  metadiaphyseal cortex forming pseudoarticulation at this site. There is an ankle joint effusion which was also present on prior film from 2010 and may be chronic. No acute fractures. Stable normal alignment.    No past medical history on file.  Past Surgical History:   Procedure Laterality Date     VASECTOMY       VENTRICULOPERITONEAL SHUNT  05/14/1993     Patient Active Problem List   Diagnosis     Morbid obesity (H)     Primary insomnia     Brain tumor (benign) (H)     Essential hypertension     Impaired fasting blood sugar     Lesion of basal ganglia          Objective:      Note: Items left blank indicates the item was not performed or not indicated at the time of the evaluation.    Patient Outcome Measures :      Lower Extremity Functional Scale (_/80): 22     Scores range from 0-80, where a score of 80 represents maximum function. The minimal clinically important difference is a positive change of 9 points.    Ankle/Foot Examination  1. Acute right ankle pain     2. Difficulty walking     3. Ankle stiffness, right       Precautions: None  Involved Side: Right  Posture Observation:      Lower extremity:  Foot/Ankle:  Severe bilateral pes planus.  Moderate right hallux valgus.   Pronation R>L  (R) gastroc atrophy.     Assistive Device: None  Gait Observation: antalgic, toe out (R), decreased stance time, slow jeff  Pain with minisquat    Foot/Ankle ROM:        Date:   Ankle AROM ( )   Right    Left   Right   Left   Right   Left   Dorsiflexion-Gastroc (10 )   -7 (+)                  Dorsiflexion-Soleus (20 )                     Plantar Flexion (50 )   37 (+)                  Inversion (45-60 )   25 (+)                  Eversion (15-30 )   17 (+)                  Great Toe Extension (70 )                     Great Toe Flexion (MTP 45 , IP 90 )                     Ankle PROM ( )   Right   Left   Right   Left   Right   Left   Dorsiflexion-Gastroc (10 )                     Dorsiflexion-Soleus (20 )                  "    Plantar Flexion (50 )                     Inversion (45-60 )                     Eversion (15-30 )                     Great Toe Extension (70 )                     Great Toe Flexion (MTP 45 , IP 90 )                        Foot/Ankle Strength:         Date:     Ankle/Foot MMT (/5)   Right   Left   Right   Left   Right   Left   Dorsiflexion 4 (+)        Plantar flexion 2+ (+)        Inversion 4+ (+)        Eversion 4+ (+)        Great Toe Extension           Foot/Ankle Special Tests:     Ligament Tests (+/-)  Right  Left  Fracture Tests (+/-)  Right   Left     Anterior Drawer         [place] Ankle Rule          Talar Tilt         [place] Foot Rule          Impingement Tests (+/-)  Right  Left   Heel Tap \"Bump\"      Impingement sign     Squeeze Test      Impingement sign cluster   1. Ant-lat ankle tenderness.   2. Ant-lat ankle swelling.   3. Pain with forced DF and Eversion.   4. Pain with SL squat.   5. Pain with activities.   6. Ankle instability.    (5 or more is positive)     [place] Test      Achilles Tests (+/-)  RIght   Left  Plantar Fasciitis Tests (+/-)  Right  Left    Manzano's Calf Squeeze         Windlass (NWB) for plantar fasciitis           Arc Sign         Windlass (WB) for plantar fasciitis           [facility] Test        Other:          Other:        Other:          Other:        Other:           Palpation: Major tenderness of distal fibula/inf lateral malleolus, (R) post/lat fibula  Mod tenderness of plantar calcaneus with referral to lat ankle.     Joint Mobility testing: NA today, acute pain    Foot/Ankle Special Tests:    Partial squat painful       Treatment Today     TREATMENT MINUTES COMMENTS   Evaluation 35 Plan of care and goals developed in collaboration with patient.   Discussed findings/condition, related anatomy.   Self-care/ Home management     Manual therapy 10 Induction, indirect, direct techniques utilized as appropriate for optimal tissue release.   MFR/SCS - (R) " DLPERF-LV/mult pts,    Neuromuscular Re-education     Therapeutic Activity     Therapeutic Exercises 15 kinesio tape - (R) lat ankle sprain. Patient educated in tape wear and issued handout.   Exercises:  Exercise #1: ankle ROM  Comment #1: DF/PF, inv/ev 10 x   Exercise #2: ankle circles  Comment #2: 10 x CW/CCW       Gait training     Modality__________________                Total 60    Blank areas are intentional and mean the treatment did not include these items.     PT Evaluation Code: (Please list factors)  Patient History/Comorbidities: hx of fibular fx, see history  Examination: 2  Clinical Presentation: stable  Clinical Decision Making: low    Patient History/  Comorbidities Examination  (body structures and functions, activity limitations, and/or participation restrictions) Clinical Presentation Clinical Decision Making (Complexity)   No documented Comorbidities or personal factors 1-2 Elements Stable and/or uncomplicated Low   1-2 documented comorbidities or personal factor 3 Elements Evolving clinical presentation with changing characteristics Moderate   3-4 documented comorbidities or personal factors 4 or more Unstable and unpredictable High                Triny MIRZA Gt  6/10/2021

## 2021-07-04 NOTE — PATIENT INSTRUCTIONS - HE
Patient Instructions by Triny Del Real PT at 6/10/2021 11:00 AM     Author: Triny Del Real PT Service: -- Author Type: Physical Therapist    Filed: 6/10/2021 12:02 PM Encounter Date: 6/10/2021 Status: Signed    : Triny Del Real PT (Physical Therapist)              ANKLE CIRCLES    Move your ankle in a circular pattern one direction for several repetitions and then reverse the direction.            Wearing Kinesio tape  Kinesio Tape is designed to gently create forces on the skin's surface, elevating the tissue and relieving pressure. Taping over and around muscles provides support and assistance to muscle, or can help prevent over-contraction, while still allowing full range of motion. Kinesio Tape also help facilitate lymphatic flow, blood circulation, and normal function of the neurological system, which are all vital to healthy tissue.     Kinesio Tape can be worn for 3 days.    Tape can be worn for showering or bathing. The tape is made of cotton fabric with a waterproof acrylic adhesive. It is latex free.    After showering or bathing, pat the tape dry with a towel. Do not rub over the tape, as this will cause the edges to roll and peel.     Allow the tape to air dry for about 20 minutes. Do not use a hair dryer.    If a section of tape rolls or peels away, trim it off with a scissors.   Please note if there are changes in your symptoms while wearing Kinesio Tape and share this information with your therapist at your next appointment.   Remove the tape if your pain level increases or you experience itching or skin irritation.

## 2021-07-05 ENCOUNTER — PRE VISIT (OUTPATIENT)
Dept: NEUROSURGERY | Facility: CLINIC | Age: 41
End: 2021-07-05

## 2021-07-05 NOTE — TELEPHONE ENCOUNTER
FUTURE VISIT INFORMATION      FUTURE VISIT INFORMATION:    Date: 7/13/2021    Time: 8am    Location: [facility]  REFERRAL INFORMATION:    Referring provider:  Dr. Sin    Referring providers clinic:  [facility]     Reason for visit/diagnosis  Benign Neoplasm of the brain, lesion of Basal Ganglia     RECORDS REQUESTED FROM:       Clinic name Comments Records Status Imaging Status   [facility] Dr. Sin-3/9/2021, 6/8/2021 Care EVerywhere N/A          [facility]-12/19/2017 Care Everywhere Requested to PACS                        7/5/2021-Request for Images faxed to [facility] @ 151pm    7/12/2021-[facility] Images now in PACS-MR @ 628am

## 2021-07-06 VITALS
SYSTOLIC BLOOD PRESSURE: 149 MMHG | TEMPERATURE: 97.6 F | DIASTOLIC BLOOD PRESSURE: 98 MMHG | HEART RATE: 76 BPM | WEIGHT: 315 LBS | BODY MASS INDEX: 47.62 KG/M2

## 2021-07-12 ASSESSMENT — ENCOUNTER SYMPTOMS
ALTERED TEMPERATURE REGULATION: 0
FEVER: 0
POLYDIPSIA: 0
WEIGHT GAIN: 0
CHILLS: 0
POLYPHAGIA: 0
DOUBLE VISION: 0
HALLUCINATIONS: 0
WEIGHT LOSS: 0
EYE IRRITATION: 1
FATIGUE: 1
EYE WATERING: 0
EYE REDNESS: 0
DECREASED APPETITE: 0
INCREASED ENERGY: 1
EYE PAIN: 1
NIGHT SWEATS: 0

## 2021-07-13 ENCOUNTER — OFFICE VISIT (OUTPATIENT)
Dept: NEUROSURGERY | Facility: CLINIC | Age: 41
End: 2021-07-13
Payer: COMMERCIAL

## 2021-07-13 VITALS
DIASTOLIC BLOOD PRESSURE: 97 MMHG | WEIGHT: 315 LBS | SYSTOLIC BLOOD PRESSURE: 146 MMHG | BODY MASS INDEX: 47.89 KG/M2 | HEART RATE: 73 BPM | OXYGEN SATURATION: 98 %

## 2021-07-13 DIAGNOSIS — D33.2 BENIGN NEOPLASM OF BRAIN, UNSPECIFIED BRAIN REGION (H): Primary | ICD-10-CM

## 2021-07-13 PROCEDURE — 99203 OFFICE O/P NEW LOW 30 MIN: CPT | Performed by: NEUROLOGICAL SURGERY

## 2021-07-13 NOTE — PROGRESS NOTES
Service Date: 2021    I had the pleasure to meet Nancy today in my Neurosurgical Clinic for evaluation of a right basal ganglial mass.    Briefly, Nancy is a 40-year-old gentleman originally from [place] who moved to [place] when he was in his teenage years when his mom moved here for school.  In , he was diagnosed with a brain mass in the right basal ganglia.  At that point in time, he had a needle biopsy from the right and then he had a shunt placed in what he described was a cyst from the left frontal side.  He has never had this shunt revised.  He is not sure what the pathology results were.  However, he was told it was benign.  He subsequently had an MRI apparently in  and then another MRI in 2017.  The report from 2017 describes a lesion in the right basal ganglia, which is decreased in size compared to 2006.    He did not have any followup after the last MRI in 2017.    He said that he now has 2 young children and he is trying to be healthy and he wants to reestablish care for the brain lesion.  His wife works for [employer] and their insurance supports him coming to [facility] for his care.  That is how he sought out neurosurgical care here.    At this point in time, I am happy to reestablish care with him.  It is important to obtain the pathology results from the biopsy in  and then I will plan to repeat an MRI of his brain with and without contrast and have him come back in 4 weeks and we can compare this to the previous MRI in 2017.  Hopefully, by that time, we will also have his pathology results here as well.    Overall, I spent approximately 20 minutes with Nancy with the majority of this time spent in consultation and developing a treatment plan.    Andrei Peña MD        D: 2021   T: 2021   MT: kulwant    Name:     NANCY PUENTE  MRN:      [id number]        Account:      [id number]   :      1980           Service Date: 2021       Document:  [id number]

## 2021-07-13 NOTE — PATIENT INSTRUCTIONS
Follow up with Dr Peña in 4 weeks with MRI Brain with and without contrast.    Call Daxa AGOSTO for questions/concerns     Thank you for using  [facility], note sent to Medical Record Team.

## 2021-07-13 NOTE — LETTER
7/13/2021       RE: Lionel Espitia Jr.  [address]  [place] [address]     Dear Colleague,    Thank you for referring your patient, Lionel Espitia Jr., to the [facility] [place] at [facility]. Please see a copy of my visit note below.    Service Date: 07/13/2021    I had the pleasure to meet Lionel today in my Neurosurgical Clinic for evaluation of a right basal ganglial mass.    Briefly, Lionel is a 40-year-old gentleman originally from [place] who moved to [place] when he was in his teenage years when his mom moved here for school.  In 1993, he was diagnosed with a brain mass in the right basal ganglia.  At that point in time, he had a needle biopsy from the right and then he had a shunt placed in what he described was a cyst from the left frontal side.  He has never had this shunt revised.  He is not sure what the pathology results were.  However, he was told it was benign.  He subsequently had an MRI apparently in 2006 and then another MRI in 2017.  The report from 2017 describes a lesion in the right basal ganglia, which is decreased in size compared to 2006.    He did not have any followup after the last MRI in 2017.    He said that he now has 2 young children and he is trying to be healthy and he wants to reestablish care for the brain lesion.  His wife works for [employer] and their insurance supports him coming to [facility] for his care.  That is how he sought out neurosurgical care here.    At this point in time, I am happy to reestablish care with him.  It is important to obtain the pathology results from the biopsy in 1993 and then I will plan to repeat an MRI of his brain with and without contrast and have him come back in 4 weeks and we can compare this to the previous MRI in 2017.  Hopefully, by that time, we will also have his pathology results here as well.    Overall, I spent approximately 20 minutes with Lionel with  the majority of this time spent in consultation and developing a treatment plan.    Andrei Peña MD        D: 2021   T: 2021   MT: kulwant    Name:     NANCY PUENTE  MRN:      [id number]        Account:      [id number]   :      1980           Service Date: 2021       Document: [id number]      Again, thank you for allowing me to participate in the care of your patient.      Sincerely,    Andrei Peña MD

## 2021-07-14 ENCOUNTER — IMMUNIZATION (OUTPATIENT)
Dept: NURSING | Facility: CLINIC | Age: 41
End: 2021-07-14
Payer: COMMERCIAL

## 2021-07-14 PROCEDURE — 0001A PR COVID VAC PFIZER DIL RECON 30 MCG/0.3 ML IM: CPT

## 2021-07-14 PROCEDURE — 91300 PR COVID VAC PFIZER DIL RECON 30 MCG/0.3 ML IM: CPT

## 2021-08-01 ENCOUNTER — ANCILLARY PROCEDURE (OUTPATIENT)
Dept: MRI IMAGING | Facility: CLINIC | Age: 41
End: 2021-08-01
Attending: NEUROLOGICAL SURGERY
Payer: COMMERCIAL

## 2021-08-01 ENCOUNTER — HEALTH MAINTENANCE LETTER (OUTPATIENT)
Age: 41
End: 2021-08-01

## 2021-08-01 DIAGNOSIS — D33.2 BENIGN NEOPLASM OF BRAIN, UNSPECIFIED BRAIN REGION (H): ICD-10-CM

## 2021-08-01 PROCEDURE — A9585 GADOBUTROL INJECTION: HCPCS | Performed by: RADIOLOGY

## 2021-08-01 PROCEDURE — 70553 MRI BRAIN STEM W/O & W/DYE: CPT | Mod: GC | Performed by: RADIOLOGY

## 2021-08-01 RX ORDER — GADOBUTROL 604.72 MG/ML
15 INJECTION INTRAVENOUS ONCE
Status: COMPLETED | OUTPATIENT
Start: 2021-08-01 | End: 2021-08-01

## 2021-08-01 RX ADMIN — GADOBUTROL 15 ML: 604.72 INJECTION INTRAVENOUS at 08:56

## 2021-08-01 NOTE — DISCHARGE INSTRUCTIONS
MRI Contrast Discharge Instructions    The IV contrast you received today will pass out of your body in your  urine. This will happen in the next 24 hours. You will not feel this process.  Your urine will not change color.    Drink at least 4 extra glasses of water or juice today (unless your doctor  has restricted your fluids). This reduces the stress on your kidneys.  You may take your regular medicines.    If you are on dialysis: It is best to have dialysis today.    If you have a reaction: Most reactions happen right away. If you have  any new symptoms after leaving the hospital (such as hives or swelling),  call your hospital at the correct number below. Or call your family doctor.  If you have breathing distress or wheezing, call 911.    Special instructions: ***    I have read and understand the above information.    Signature:______________________________________ Date:___________    Staff:__________________________________________ Date:___________     Time:__________    [facility] Departments:    ___[place]: [phone]  ___[facility]: [phone]  ___[place]: [phone] ___[facility]: [phone]  ___[facility]: [phone]  ___[facility]: [phone]  ___[phone]  ___[facility]: [phone]  ___[phone]

## 2021-08-04 ENCOUNTER — IMMUNIZATION (OUTPATIENT)
Dept: NURSING | Facility: CLINIC | Age: 41
End: 2021-08-04
Attending: FAMILY MEDICINE
Payer: COMMERCIAL

## 2021-08-04 PROCEDURE — 91300 PR COVID VAC PFIZER DIL RECON 30 MCG/0.3 ML IM: CPT | Performed by: FAMILY MEDICINE

## 2021-08-04 PROCEDURE — 0002A PR COVID VAC PFIZER DIL RECON 30 MCG/0.3 ML IM: CPT | Performed by: FAMILY MEDICINE

## 2021-08-06 ENCOUNTER — MYC MEDICAL ADVICE (OUTPATIENT)
Dept: FAMILY MEDICINE | Facility: CLINIC | Age: 41
End: 2021-08-06

## 2021-08-06 DIAGNOSIS — I10 ESSENTIAL HYPERTENSION: Primary | ICD-10-CM

## 2021-08-06 NOTE — TELEPHONE ENCOUNTER
DOD pt is requesting Bp monitor with XL band. Medication refill requested. Please authorize medication if appropriate.

## 2021-08-08 ENCOUNTER — MYC MEDICAL ADVICE (OUTPATIENT)
Dept: FAMILY MEDICINE | Facility: CLINIC | Age: 41
End: 2021-08-08

## 2021-08-09 ENCOUNTER — ALLIED HEALTH/NURSE VISIT (OUTPATIENT)
Dept: FAMILY MEDICINE | Facility: CLINIC | Age: 41
End: 2021-08-09
Payer: COMMERCIAL

## 2021-08-09 ENCOUNTER — TELEPHONE (OUTPATIENT)
Dept: FAMILY MEDICINE | Facility: CLINIC | Age: 41
End: 2021-08-09

## 2021-08-09 VITALS — DIASTOLIC BLOOD PRESSURE: 90 MMHG | SYSTOLIC BLOOD PRESSURE: 130 MMHG

## 2021-08-09 DIAGNOSIS — I10 ESSENTIAL HYPERTENSION: Primary | ICD-10-CM

## 2021-08-09 PROCEDURE — 99207 PR NO CHARGE NURSE ONLY: CPT

## 2021-08-09 NOTE — PROGRESS NOTES
I met with Lionel Espitia Jr. at the request of Dr. Sin to recheck his blood pressure.  Blood pressure medications on the med list were reviewed with patient.    Patient has taken all medications as per usual regimen: Yes  Patient reports tolerating them without any issues or concerns: Yes    Vitals:    08/09/21 1338 08/09/21 1352   BP: (!) 132/94 (!) 130/90   BP Location: Left arm Left arm   Patient Position: Sitting Sitting   Cuff Size: Adult Large Adult Large       After 5 minutes, the patient's blood pressure remained greater than or equal to 140/90.    Is the patient currently having any chest pain? No  Does the patient currently have a headache? No  Does the patient currently have any vision changes? Yes, but just had an eye exam   Does the patient currently have any nausea? No  Does the patient currently have any abdominal pain? No    The previous encounter was reviewed.  The patient was discharged and the note will be sent to the provider for final review.\

## 2021-08-11 NOTE — TELEPHONE ENCOUNTER
Please call patient with message:      Hello, hope your summer is going well.   Your most recent BP was over the target range of 140/90.      We should reassess to make sure this is controlled.      Please schedule an appointment to recheck it.   The appointment can be a virtual one, if you are able to check your blood pressure at home.      Dr. Sin

## 2021-08-24 ENCOUNTER — OFFICE VISIT (OUTPATIENT)
Dept: NEUROSURGERY | Facility: CLINIC | Age: 41
End: 2021-08-24
Payer: COMMERCIAL

## 2021-08-24 VITALS
SYSTOLIC BLOOD PRESSURE: 133 MMHG | RESPIRATION RATE: 16 BRPM | HEART RATE: 79 BPM | OXYGEN SATURATION: 98 % | BODY MASS INDEX: 49.09 KG/M2 | DIASTOLIC BLOOD PRESSURE: 84 MMHG | WEIGHT: 315 LBS

## 2021-08-24 DIAGNOSIS — D33.2 BENIGN NEOPLASM OF BRAIN, UNSPECIFIED BRAIN REGION (H): Primary | ICD-10-CM

## 2021-08-24 PROCEDURE — 99213 OFFICE O/P EST LOW 20 MIN: CPT | Performed by: NEUROLOGICAL SURGERY

## 2021-08-24 ASSESSMENT — PAIN SCALES - GENERAL: PAINLEVEL: NO PAIN (0)

## 2021-08-24 NOTE — LETTER
2021       RE: Lionel Espitia Jr.  [address]  [place] [address]     Dear Colleague,    Thank you for referring your patient, Lionel Espitia Jr., to the [facility]. Please see a copy of my visit note below.    Service Date: 2021    Tashi Sin MD  [address] [address]    RE:  Lionel Espitia  MRN: [id number]  : 1980    Dear Dr. Sin:      I had the pleasure to see Lionel today in my Neurosurgical Clinic for followup evaluation of a right caudate striatal benign brain tumor.    I recently met Lionel to establish care.  He has a history of a benign brain tumor located in the caudal striatal region on the right.  When I last saw him, we did not have pathology from his needle biopsy that was performed almost 20 years ago down in [place].  We are still trying to track that down.  In the meantime, we did get an MRI to compare to his previous study.  He returns today to review this MRI.    Since I last saw him, he is doing well without any neurologic symptoms.    The MRI again demonstrates a persistent mass centered in the right corpus striatum with mild decrease in size secondary to decreased central cystic necrotic component.    Today, we did again go over the MRI together.  The mass is again decreased in size slightly.  It certainly has not enlarged in size.      At this point in time, I am happy to continue observing this and following it with serial imaging.  My recommendation would be to repeat the MRI with and without contrast in 1 year.  In the meantime, we will still attempt to track down the pathology of his biopsy since he does not know what the pathology is.    Sincerely,     Andrei Peña MD

## 2021-08-24 NOTE — PROGRESS NOTES
Service Date: 2021    Tashi Sin MD  [address] [address]    RE:  Nancy Espitia  MRN: [id number]  : 1980    Dear Dr. Sin:      I had the pleasure to see Nancy today in my Neurosurgical Clinic for followup evaluation of a right caudate striatal benign brain tumor.    I recently met Nancy to establish care.  He has a history of a benign brain tumor located in the caudal striatal region on the right.  When I last saw him, we did not have pathology from his needle biopsy that was performed almost 20 years ago down in [place].  We are still trying to track that down.  In the meantime, we did get an MRI to compare to his previous study.  He returns today to review this MRI.    Since I last saw him, he is doing well without any neurologic symptoms.    The MRI again demonstrates a persistent mass centered in the right corpus striatum with mild decrease in size secondary to decreased central cystic necrotic component.    Today, we did again go over the MRI together.  The mass is again decreased in size slightly.  It certainly has not enlarged in size.      At this point in time, I am happy to continue observing this and following it with serial imaging.  My recommendation would be to repeat the MRI with and without contrast in 1 year.  In the meantime, we will still attempt to track down the pathology of his biopsy since he does not know what the pathology is.    Sincerely,     Andrei Peña MD        D: 2021   T: 2021   MT: [place]    Name:     NANCY ESPITIA  MRN:      [id number]        Account:      [id number]   :      1980           Service Date: 2021       Document: [id number]

## 2021-08-25 NOTE — PATIENT INSTRUCTIONS
Follow up with Dr Peña in one year with MRI Brain with and without contrast prior.    Continue to try and get Biopsy results from 1993, he was diagnosed with a brain mass in the right basal ganglia in [place].      Call Daxa RN for questions/concerns     Thank you for using [facility]

## 2021-08-29 ENCOUNTER — MYC MEDICAL ADVICE (OUTPATIENT)
Dept: FAMILY MEDICINE | Facility: CLINIC | Age: 41
End: 2021-08-29
Payer: COMMERCIAL

## 2021-08-29 DIAGNOSIS — Z20.822 EXPOSURE TO COVID-19 VIRUS: Primary | ICD-10-CM

## 2021-08-29 PROCEDURE — 99207 PR NO CHARGE LOS: CPT | Performed by: FAMILY MEDICINE

## 2021-08-30 ENCOUNTER — VIRTUAL VISIT (OUTPATIENT)
Dept: URGENT CARE | Facility: CLINIC | Age: 41
End: 2021-08-30
Payer: COMMERCIAL

## 2021-08-30 ENCOUNTER — LAB (OUTPATIENT)
Dept: FAMILY MEDICINE | Facility: CLINIC | Age: 41
End: 2021-08-30
Attending: EMERGENCY MEDICINE
Payer: COMMERCIAL

## 2021-08-30 DIAGNOSIS — R05.9 COUGH: Primary | ICD-10-CM

## 2021-08-30 DIAGNOSIS — Z20.822 EXPOSURE TO COVID-19 VIRUS: ICD-10-CM

## 2021-08-30 DIAGNOSIS — R05.9 COUGH: ICD-10-CM

## 2021-08-30 PROCEDURE — U0005 INFEC AGEN DETEC AMPLI PROBE: HCPCS

## 2021-08-30 PROCEDURE — U0003 INFECTIOUS AGENT DETECTION BY NUCLEIC ACID (DNA OR RNA); SEVERE ACUTE RESPIRATORY SYNDROME CORONAVIRUS 2 (SARS-COV-2) (CORONAVIRUS DISEASE [COVID-19]), AMPLIFIED PROBE TECHNIQUE, MAKING USE OF HIGH THROUGHPUT TECHNOLOGIES AS DESCRIBED BY CMS-2020-01-R: HCPCS

## 2021-08-30 PROCEDURE — 99213 OFFICE O/P EST LOW 20 MIN: CPT | Mod: TEL | Performed by: EMERGENCY MEDICINE

## 2021-08-30 NOTE — PATIENT INSTRUCTIONS
Instructions for Patients  It is recommended that you have a test for coronavirus (COVID-19). This illness can cause fever, cough and trouble breathing. Many people get a mild case and get better on their own. Some people can get very sick.     Please follow these steps:    1. We will call to schedule your test.  2. A member of our care team will ask you some questions. Then, they will use a swab to collect samples from your nose and throat.     Our testing team will send you your test results.    How can I protect others?    Stay home and away from others (self-isolate) until:    You ve had no fever--and no medicine that reduces fever--for 1 full day (24 hours). And      Your other symptoms have resolved (gotten better). For example, your cough or breathing has improved. And     At least 10 days have passed since your symptoms started.    Stay at least 6 feet away from others. (If someone will drive you to your test, stay in the backseat, as far away from the  as you can.)     Don t go to work, school or anywhere else. When it s time for your test, go straight to the testing site. Don t make any stops on the way there or back.     Wash your hands and face often. Use soap and water.     Cover your mouth and nose with a mask, tissue or washcloth.     Don t touch anyone. No hugging, kissing or handshakes.    How can I take care of myself?    1. Get lots of rest. Drink extra fluids (unless a doctor has told you not to).     2. Take Tylenol (acetaminophen) for fever or pain. If you have liver or kidney problems, ask your family doctor if it's okay to take Tylenol.     Adults can take either:     650 mg (two 325 mg pills) every 4 to 6 hours, or     1,000 mg (two 500 mg pills) every 8 hours as needed.     Note: Don't take more than 3,000 mg in one day.   Acetaminophen is found in many medicines (both prescribed and over-the-counter medicines). Read all labels to be sure you don't take too much.   For children,  check the Tylenol bottle for the right dose. The dose is based on  the child's age or weight.    3. If you have other health problems (like cancer, heart failure, an organ transplant or severe kidney disease): Call your specialty clinic if you don't feel better in the next 2 days.    4. Know when to call 911: If your breathing is so bad that it keeps you from doing normal activities, call 911 or go to the emergency room. Tell them that you've been staying home and may have COVID-19.      Thank you for limiting contact with others, wearing a simple mask to cover your cough, practice good hand hygiene habits and accessing our virtual services where possible to limit the spread of this virus.    For more information about COVID19 and options for caring for yourself at home, please visit the CDC website at https://www.cdc.gov/coronavirus/2019-ncov/about/steps-when-sick.html  For more options for care at [facility], please visit our website at [email or web address]

## 2021-08-30 NOTE — PROGRESS NOTES
Phone appointment:    Assessment: Exposed to Covid positive person 4 days ago.  3-day history of symptoms including sinus congestion, cough, headache.  Slight dyspnea at times.    Plan: Covid PCR ordered.  Testing team to follow.  Reconnect with medical care as needed.    CHIEF COMPLAINT: Cough.  Covid exposure.      HPI: Patient is a 40-year-old male who was exposed to a Covid positive person 4 days ago.  He developed symptoms of sinus congestion cough, headache, and mild dyspnea 3 days ago.  He feels generally comfortable.      ROS: See HPI otherwise normal.    Allergies   Allergen Reactions     Lisinopril Other (See Comments)     Coughing       Current Outpatient Medications   Medication Sig Dispense Refill     albuterol (PROAIR HFA/PROVENTIL HFA/VENTOLIN HFA) 108 (90 Base) MCG/ACT inhaler Inhale 2 puffs into the lungs every 6 hours as needed for shortness of breath / dyspnea or wheezing 1 Inhaler 0     amLODIPine-atorvastatin (CADUET) 10-10 MG tablet Take 1 tablet by mouth daily Unknown dosage       hydrochlorothiazide (HYDRODIURIL) 12.5 MG tablet Take by mouth daily Unknown dosage           PE: No acute distress on the phone.  Alert.  Nondyspneic sounding.        TREATMENT: None.      ASSESSMENT: Minor URI symptoms status post Covid exposure.      DIAGNOSIS: Cough.  Covid exposure.      PLAN: Covid PCR ordered.  Testing team to follow.    Time: 12 minutes

## 2021-08-31 LAB — SARS-COV-2 RNA RESP QL NAA+PROBE: NEGATIVE

## 2021-09-02 ENCOUNTER — LAB (OUTPATIENT)
Dept: FAMILY MEDICINE | Facility: CLINIC | Age: 41
End: 2021-09-02
Attending: FAMILY MEDICINE
Payer: COMMERCIAL

## 2021-09-02 DIAGNOSIS — Z20.822 EXPOSURE TO COVID-19 VIRUS: ICD-10-CM

## 2021-09-02 LAB — SARS-COV-2 RNA RESP QL NAA+PROBE: NEGATIVE

## 2021-09-02 PROCEDURE — U0005 INFEC AGEN DETEC AMPLI PROBE: HCPCS

## 2021-09-02 PROCEDURE — U0003 INFECTIOUS AGENT DETECTION BY NUCLEIC ACID (DNA OR RNA); SEVERE ACUTE RESPIRATORY SYNDROME CORONAVIRUS 2 (SARS-COV-2) (CORONAVIRUS DISEASE [COVID-19]), AMPLIFIED PROBE TECHNIQUE, MAKING USE OF HIGH THROUGHPUT TECHNOLOGIES AS DESCRIBED BY CMS-2020-01-R: HCPCS

## 2021-09-26 ENCOUNTER — HEALTH MAINTENANCE LETTER (OUTPATIENT)
Age: 41
End: 2021-09-26

## 2021-12-13 ENCOUNTER — MYC MEDICAL ADVICE (OUTPATIENT)
Dept: FAMILY MEDICINE | Facility: CLINIC | Age: 41
End: 2021-12-13
Payer: COMMERCIAL

## 2021-12-13 DIAGNOSIS — I10 ESSENTIAL HYPERTENSION: Primary | ICD-10-CM

## 2021-12-15 NOTE — TELEPHONE ENCOUNTER
"Routing refill request to provider for review/approval because:  Medication is reported/historical for both medications in [facility].     Last Written Prescription Date:  n/a  Last Fill Quantity: n/a,  # refills: n/a   Last office visit provider:  3/9/21     Last Written Prescription Date:  n/a  Last Fill Quantity: n/a,  # refills: n/a     Requested Prescriptions   Pending Prescriptions Disp Refills     hydrochlorothiazide (HYDRODIURIL) 12.5 MG tablet       Sig: Take 1 tablet (12.5 mg) by mouth daily Unknown dosage       Diuretics (Including Combos) Protocol Passed - 12/13/2021  2:14 PM        Passed - Blood pressure under 140/90 in past 12 months     BP Readings from Last 3 Encounters:   08/24/21 133/84   08/09/21 (!) 130/90   07/13/21 (!) 146/97                 Passed - Recent (12 mo) or future (30 days) visit within the authorizing provider's specialty     Patient has had an office visit with the authorizing provider or a provider within the authorizing providers department within the previous 12 mos or has a future within next 30 days. See \"Patient Info\" tab in inbasket, or \"Choose Columns\" in Meds & Orders section of the refill encounter.              Passed - Medication is active on med list        Passed - Patient is age 18 or older        Passed - Normal serum creatinine on file in past 12 months     Recent Labs   Lab Test 03/10/21  0808   CR 0.95              Passed - Normal serum potassium on file in past 12 months     Recent Labs   Lab Test 03/10/21  0808   POTASSIUM 4.0                    Passed - Normal serum sodium on file in past 12 months     Recent Labs   Lab Test 03/10/21  0808                    amLODIPine-atorvastatin (CADUET) 10-10 MG tablet       Sig: Take 1 tablet by mouth daily Unknown dosage       Calcium Channel Blockers Protocol  Passed - 12/13/2021  2:14 PM        Passed - Blood pressure under 140/90 in past 12 months     BP Readings from Last 3 Encounters:   08/24/21 " "133/84   08/09/21 (!) 130/90   07/13/21 (!) 146/97                 Passed - Recent (12 mo) or future (30 days) visit within the authorizing provider's specialty     Patient has had an office visit with the authorizing provider or a provider within the authorizing providers department within the previous 12 mos or has a future within next 30 days. See \"Patient Info\" tab in inbasket, or \"Choose Columns\" in Meds & Orders section of the refill encounter.              Passed - Medication is active on med list        Passed - Patient is age 18 or older        Passed - Normal serum creatinine on file in past 12 months     Recent Labs   Lab Test 03/10/21  0808   CR 0.95       Ok to refill medication if creatinine is low         Statins Protocol Passed - 12/13/2021  2:14 PM        Passed - LDL on file in past 12 months     Recent Labs   Lab Test 03/10/21  0808                Passed - No abnormal creatine kinase in past 12 months     No lab results found.             Passed - Recent (12 mo) or future (30 days) visit within the authorizing provider's specialty     Patient has had an office visit with the authorizing provider or a provider within the authorizing providers department within the previous 12 mos or has a future within next 30 days. See \"Patient Info\" tab in inbasket, or \"Choose Columns\" in Meds & Orders section of the refill encounter.              Passed - Medication is active on med list        Passed - Patient is age 18 or older             Claudia Hatch RN 12/15/21 2:41 PM  "

## 2021-12-16 RX ORDER — AMLODIPINE BESYLATE 10 MG/1
10 TABLET ORAL DAILY
Qty: 90 TABLET | Refills: 0 | Status: SHIPPED | OUTPATIENT
Start: 2021-12-16 | End: 2022-01-26

## 2021-12-16 RX ORDER — HYDROCHLOROTHIAZIDE 12.5 MG/1
12.5 TABLET ORAL DAILY
Qty: 90 TABLET | Refills: 0 | Status: SHIPPED | OUTPATIENT
Start: 2021-12-16 | End: 2022-01-26

## 2021-12-16 RX ORDER — AMLODIPINE BESYLATE AND ATORVASTATIN CALCIUM 10; 10 MG/1; MG/1
1 TABLET, FILM COATED ORAL DAILY
Qty: 90 TABLET | Refills: 0 | OUTPATIENT
Start: 2021-12-16

## 2022-01-26 ENCOUNTER — OFFICE VISIT (OUTPATIENT)
Dept: FAMILY MEDICINE | Facility: CLINIC | Age: 42
End: 2022-01-26
Payer: COMMERCIAL

## 2022-01-26 VITALS
HEART RATE: 106 BPM | BODY MASS INDEX: 41.75 KG/M2 | SYSTOLIC BLOOD PRESSURE: 132 MMHG | WEIGHT: 315 LBS | DIASTOLIC BLOOD PRESSURE: 88 MMHG | HEIGHT: 73 IN | OXYGEN SATURATION: 97 % | TEMPERATURE: 99.2 F

## 2022-01-26 DIAGNOSIS — E66.01 MORBID OBESITY (H): ICD-10-CM

## 2022-01-26 DIAGNOSIS — R73.01 IMPAIRED FASTING BLOOD SUGAR: ICD-10-CM

## 2022-01-26 DIAGNOSIS — D33.2 BENIGN NEOPLASM OF BRAIN, UNSPECIFIED BRAIN REGION (H): ICD-10-CM

## 2022-01-26 DIAGNOSIS — I10 ESSENTIAL HYPERTENSION: ICD-10-CM

## 2022-01-26 DIAGNOSIS — Z11.59 ENCOUNTER FOR HEPATITIS C SCREENING TEST FOR LOW RISK PATIENT: ICD-10-CM

## 2022-01-26 DIAGNOSIS — Z00.00 ROUTINE GENERAL MEDICAL EXAMINATION AT A HEALTH CARE FACILITY: Primary | ICD-10-CM

## 2022-01-26 PROCEDURE — 90686 IIV4 VACC NO PRSV 0.5 ML IM: CPT | Performed by: FAMILY MEDICINE

## 2022-01-26 PROCEDURE — G0008 ADMIN INFLUENZA VIRUS VAC: HCPCS | Performed by: FAMILY MEDICINE

## 2022-01-26 PROCEDURE — 99395 PREV VISIT EST AGE 18-39: CPT | Mod: 25 | Performed by: FAMILY MEDICINE

## 2022-01-26 RX ORDER — HYDROCHLOROTHIAZIDE 25 MG/1
25 TABLET ORAL DAILY
Qty: 90 TABLET | Refills: 1 | Status: SHIPPED | OUTPATIENT
Start: 2022-01-26 | End: 2022-02-23

## 2022-01-26 RX ORDER — AMLODIPINE BESYLATE 10 MG/1
10 TABLET ORAL DAILY
Qty: 90 TABLET | Refills: 1 | Status: SHIPPED | OUTPATIENT
Start: 2022-01-26 | End: 2022-11-11

## 2022-01-26 RX ORDER — HYDROCHLOROTHIAZIDE 25 MG/1
12.5 TABLET ORAL DAILY
Qty: 90 TABLET | Refills: 1 | Status: SHIPPED | OUTPATIENT
Start: 2022-01-26 | End: 2022-01-26

## 2022-01-26 ASSESSMENT — MIFFLIN-ST. JEOR: SCORE: 2608.28

## 2022-01-26 NOTE — PROGRESS NOTES
SUBJECTIVE:   CC: Lionel Espitia Jr. is an 41 year old male who presents for preventative health visit.     Healthy Habits:     Getting at least 3 servings of Calcium per day:  Yes    Bi-annual eye exam:  Yes    Dental care twice a year:  Yes    Sleep apnea or symptoms of sleep apnea:  Daytime drowsiness and Sleep apnea    Diet:  Breakfast skipped    Frequency of exercise:  2-3 days/week    Duration of exercise:  15-30 minutes    Taking medications regularly:  Yes    Medication side effects:  Not applicable    PHQ-2 Total Score: 1    Additional concerns today:  No    New job and a nice raise!  Feeling ok.  No significant symptoms  Has an insurance form.  Needs labs.  Want to pick it up rather than have us fax it.    Today's PHQ-2 Score:   PHQ-2 ( 1999 Pfizer) 1/25/2022   Q1: Little interest or pleasure in doing things 1   Q2: Feeling down, depressed or hopeless 0   PHQ-2 Score 1   PHQ-2 Total Score (12-17 Years)- Positive if 3 or more points; Administer PHQ-A if positive -   Q1: Little interest or pleasure in doing things Several days   Q2: Feeling down, depressed or hopeless Not at all   PHQ-2 Score 1     Social History     Tobacco Use     Smoking status: Never Smoker     Smokeless tobacco: Never Used   Substance Use Topics     Alcohol use: Not Currently       Alcohol Use 1/25/2022   Prescreen: >3 drinks/day or >7 drinks/week? No     BP Readings from Last 3 Encounters:   01/26/22 132/88   08/24/21 133/84   08/09/21 (!) 130/90    Wt Readings from Last 3 Encounters:   01/26/22 (!) 164.2 kg (362 lb)   08/24/21 (!) 166.5 kg (367 lb)   07/13/21 (!) 162.4 kg (358 lb)          Patient Active Problem List   Diagnosis     Morbid obesity (H)     Primary insomnia     Brain tumor (benign) (H)     Essential hypertension     Impaired fasting blood sugar     Lesion of basal ganglia     Past Surgical History:   Procedure Laterality Date     BIOPSY  1993     IMPLANT SHUNT VENTRICULOPERITONEAL  05/14/1993     VASECTOMY          Social History     Tobacco Use     Smoking status: Never Smoker     Smokeless tobacco: Never Used   Substance Use Topics     Alcohol use: Not Currently     Family History   Problem Relation Age of Onset     Diabetes Type 2  Maternal Grandmother      Diabetes Maternal Grandmother      Hypertension Maternal Grandmother      Depression Maternal Grandmother      Obesity Mother          Current Outpatient Medications   Medication Sig Dispense Refill     albuterol (PROAIR HFA/PROVENTIL HFA/VENTOLIN HFA) 108 (90 Base) MCG/ACT inhaler Inhale 2 puffs into the lungs every 6 hours as needed for shortness of breath / dyspnea or wheezing 1 Inhaler 0     amLODIPine (NORVASC) 10 MG tablet Take 1 tablet (10 mg) by mouth daily 90 tablet 1     hydrochlorothiazide (HYDRODIURIL) 25 MG tablet Take 1 tablet (25 mg) by mouth daily Unknown dosage 90 tablet 1     Allergies   Allergen Reactions     Lisinopril Other (See Comments)     Coughing      Recent Labs   Lab Test 03/10/21  0808 01/22/21  2330   A1C 6.0*  --      --    HDL 45  --    TRIG 54  --    ALT  --  51   CR 0.95 1.12   GFRESTIMATED >60 83   GFRESTBLACK >60 >90   POTASSIUM 4.0 2.9*   TSH 1.27  --         Reviewed and updated as needed this visit by clinical staff  Tobacco  Allergies  Meds  Problems  Med Hx  Surg Hx  Fam Hx         Reviewed and updated as needed this visit by Provider  Tobacco  Allergies  Meds  Problems  Med Hx  Surg Hx  Fam Hx        Past Medical History:   Diagnosis Date     Hypertension      Uncomplicated asthma       Past Surgical History:   Procedure Laterality Date     BIOPSY  1993     IMPLANT SHUNT VENTRICULOPERITONEAL  05/14/1993     VASECTOMY         Review of Systems  GEN: no fevers or chills   ENT: no sinus concerns, normal hearing and vision   RESP: no cough or wheezing   CV: no dyspnea, palpitations, edema or chest pain   GI: no nausea, vomiting, diarrhea, or constipation   : normal urination   ENDO: no hot or cold  "intolerance, no polydipsia or polyuria   MS: no myalgias or arthralgias   DERM: no rash or bruising   PSYCH: no depression concerns     OBJECTIVE:   Physical Exam  /88 (BP Location: Left arm, Patient Position: Sitting, Cuff Size: Thigh)   Pulse 106   Temp 99.2  F (37.3  C) (Oral)   Ht 1.85 m (6' 0.84\")   Wt (!) 164.2 kg (362 lb)   SpO2 97%   BMI 47.98 kg/m     Gen:   Alert, not distressed  Head:   Normocephalic, without obvious abnormality, atraumatic  Eyes:   PERRL, conjunctiva/corneas clear, EOM's intact  Ears:   Normal tympanic membranes and external ear canals  Nose:    Mucosa normal, no drainage or sinus tenderness  Throat:    No erythema or exudates  Neck:    No adenopathy no nodules in thyroid, normal ROM  Lungs:    Clear to auscultation bilaterally, respirations unlabored  Chest wall:  No tenderness or deformity  Heart:     Regular, normal S1 and S2, no murmur, gallop or rub  Abdomen:  Soft, non-tender, normal bowel sounds, no masses, no organomegaly  Back:    Symmetric, no curvature, ROM normal, no CVA tenderness  Extremities:   Extremities normal, atraumatic, no cyanosis or edema  Skin:     Skin color, texture, turgor normal, no rashes or lesions  Lymph nodes:   Cervical and supraclavicular nodes normal  Neurologic:   CNII-XII intact.   DTRs normal and symmetric.  Symmetric strength and sensation.    ASSESSMENT/PLAN:     Lionel was seen today for physical and form.    Diagnoses and all orders for this visit:    Routine general medical examination at a health care facility  Return for fasting labs.  -     Lipid panel reflex to direct LDL Fasting; Future    Essential hypertension  Control is reasonable, but will increase HCTZ to 25 mg.  Follow up for labs.  -     amLODIPine (NORVASC) 10 MG tablet; Take 1 tablet (10 mg) by mouth daily  -     Basic metabolic panel  (Ca, Cl, CO2, Creat, Gluc, K, Na, BUN); Future  -     hydrochlorothiazide (HYDRODIURIL) 25 MG tablet; Take 1 tablet (25 mg) by mouth " "daily Unknown dosage    Impaired fasting blood sugar  Recheck a1c  -     Hemoglobin A1c; Future    Encounter for hepatitis C screening test for low risk patient  -     Hepatitis C Screen Reflex to HCV RNA Quant and Genotype; Future    Morbid obesity (H)   Work on exercise and reduced CHO    Benign neoplasm of brain, unspecified brain region (H)   Established with Neurosurgery at [facility].   They have recommended annual MRI for now   Due August 2022.    Other orders  -     [place] FLU VAC PRESRV FREE QUAD SPLIT VIR IM  MONTHS IM  -     REVIEW OF HEALTH MAINTENANCE PROTOCOL ORDERS    COUNSELING:   Reviewed preventive health counseling, as reflected in patient instructions       Regular exercise       Healthy diet/nutrition       Immunizations    Vaccinated for: Influenza    Plan for COVID booster later         Consider Hep C screening for all patients one time for ages 18-79 years    Estimated body mass index is 47.98 kg/m  as calculated from the following:    Height as of this encounter: 1.85 m (6' 0.84\").    Weight as of this encounter: 164.2 kg (362 lb).     Weight management plan: Discussed healthy diet and exercise guidelines    He reports that he has never smoked. He has never used smokeless tobacco.      Counseling Resources:  ATP IV Guidelines  Pooled Cohorts Equation Calculator  FRAX Risk Assessment  ICSI Preventive Guidelines  Dietary Guidelines for Americans, 2010  USDA's MyPlate  ASA Prophylaxis  Lung CA Screening    Tashi Sin MD  [facility]    Future Appointments   Date Time Provider Department Center   1/27/2022  8:45 AM [facility]        "

## 2022-01-27 ENCOUNTER — LAB (OUTPATIENT)
Dept: LAB | Facility: CLINIC | Age: 42
End: 2022-01-27
Payer: COMMERCIAL

## 2022-01-27 DIAGNOSIS — Z00.00 ROUTINE GENERAL MEDICAL EXAMINATION AT A HEALTH CARE FACILITY: ICD-10-CM

## 2022-01-27 DIAGNOSIS — Z11.59 ENCOUNTER FOR HEPATITIS C SCREENING TEST FOR LOW RISK PATIENT: ICD-10-CM

## 2022-01-27 DIAGNOSIS — R73.01 IMPAIRED FASTING BLOOD SUGAR: ICD-10-CM

## 2022-01-27 DIAGNOSIS — I10 ESSENTIAL HYPERTENSION: ICD-10-CM

## 2022-01-27 LAB
ANION GAP SERPL CALCULATED.3IONS-SCNC: 9 MMOL/L (ref 5–18)
BUN SERPL-MCNC: 11 MG/DL (ref 8–22)
CALCIUM SERPL-MCNC: 10 MG/DL (ref 8.5–10.5)
CHLORIDE BLD-SCNC: 105 MMOL/L (ref 98–107)
CHOLEST SERPL-MCNC: 161 MG/DL
CO2 SERPL-SCNC: 28 MMOL/L (ref 22–31)
CREAT SERPL-MCNC: 1.14 MG/DL (ref 0.7–1.3)
FASTING STATUS PATIENT QL REPORTED: NORMAL
GFR SERPL CREATININE-BSD FRML MDRD: 84 ML/MIN/1.73M2
GLUCOSE BLD-MCNC: 101 MG/DL (ref 70–125)
HBA1C MFR BLD: 6 % (ref 0–5.6)
HDLC SERPL-MCNC: 46 MG/DL
LDLC SERPL CALC-MCNC: 102 MG/DL
POTASSIUM BLD-SCNC: 4.6 MMOL/L (ref 3.5–5)
SODIUM SERPL-SCNC: 142 MMOL/L (ref 136–145)
TRIGL SERPL-MCNC: 63 MG/DL

## 2022-01-27 PROCEDURE — 36415 COLL VENOUS BLD VENIPUNCTURE: CPT

## 2022-01-27 PROCEDURE — 86803 HEPATITIS C AB TEST: CPT

## 2022-01-27 PROCEDURE — 80048 BASIC METABOLIC PNL TOTAL CA: CPT

## 2022-01-27 PROCEDURE — 80061 LIPID PANEL: CPT

## 2022-01-27 PROCEDURE — 83036 HEMOGLOBIN GLYCOSYLATED A1C: CPT

## 2022-01-28 LAB — HCV AB SERPL QL IA: NONREACTIVE

## 2022-02-23 ENCOUNTER — TELEPHONE (OUTPATIENT)
Dept: FAMILY MEDICINE | Facility: CLINIC | Age: 42
End: 2022-02-23
Payer: COMMERCIAL

## 2022-02-23 DIAGNOSIS — Z76.0 ENCOUNTER FOR MEDICATION REFILL: Primary | ICD-10-CM

## 2022-02-23 DIAGNOSIS — I10 ESSENTIAL HYPERTENSION: ICD-10-CM

## 2022-02-23 RX ORDER — HYDROCHLOROTHIAZIDE 25 MG/1
25 TABLET ORAL DAILY
Qty: 90 TABLET | Refills: 3 | Status: SHIPPED | OUTPATIENT
Start: 2022-02-23 | End: 2022-11-11

## 2022-02-23 NOTE — TELEPHONE ENCOUNTER
Reason for Call:  Medication or medication refill:    Do you use a [facility]?  Name of the pharmacy and phone number for the current request:  [facility] pharmacy on file    Name of the medication requested: hydrochlorothiazide (HYDRODIURIL) 25 MG tablet    Other request: Pharmacy called to request provider to resend RX for this medication. They only received the RX for 0.5 tablet only. Please resend RX to pharmacy.    Can we leave a detailed message on this number? YES    Phone number patient can be reached at: Home number on file [phone] (home)    Best Time: any    Call taken on 2/23/2022 at 1:58 PM by Dale Shields

## 2022-03-01 ENCOUNTER — MYC MEDICAL ADVICE (OUTPATIENT)
Dept: FAMILY MEDICINE | Facility: CLINIC | Age: 42
End: 2022-03-01
Payer: COMMERCIAL

## 2022-03-02 NOTE — CONFIDENTIAL NOTE
The pt is stating that he is only taking 12.5mg, not the full 25mg tablet and does not want to split the pill in half. Also, which forms is the patient referring to?

## 2022-06-24 ENCOUNTER — TELEPHONE (OUTPATIENT)
Dept: NEUROSURGERY | Facility: CLINIC | Age: 42
End: 2022-06-24

## 2022-06-28 ENCOUNTER — TELEPHONE (OUTPATIENT)
Dept: NEUROSURGERY | Facility: CLINIC | Age: 42
End: 2022-06-28

## 2022-07-08 ENCOUNTER — TELEPHONE (OUTPATIENT)
Dept: NEUROSURGERY | Facility: CLINIC | Age: 42
End: 2022-07-08

## 2022-08-16 ENCOUNTER — ANCILLARY PROCEDURE (OUTPATIENT)
Dept: MRI IMAGING | Facility: CLINIC | Age: 42
End: 2022-08-16
Attending: NEUROLOGICAL SURGERY
Payer: COMMERCIAL

## 2022-08-16 DIAGNOSIS — D33.2 BENIGN NEOPLASM OF BRAIN, UNSPECIFIED BRAIN REGION (H): ICD-10-CM

## 2022-08-16 PROCEDURE — 70553 MRI BRAIN STEM W/O & W/DYE: CPT | Mod: GC | Performed by: RADIOLOGY

## 2022-08-16 PROCEDURE — A9585 GADOBUTROL INJECTION: HCPCS | Performed by: RADIOLOGY

## 2022-08-16 RX ORDER — GADOBUTROL 604.72 MG/ML
15 INJECTION INTRAVENOUS ONCE
Status: COMPLETED | OUTPATIENT
Start: 2022-08-16 | End: 2022-08-16

## 2022-08-16 RX ADMIN — GADOBUTROL 15 ML: 604.72 INJECTION INTRAVENOUS at 08:49

## 2022-08-16 NOTE — DISCHARGE INSTRUCTIONS
MRI Contrast Discharge Instructions    The IV contrast you received today will pass out of your body in your  urine. This will happen in the next 24 hours. You will not feel this process.  Your urine will not change color.    Drink at least 4 extra glasses of water or juice today (unless your doctor  has restricted your fluids). This reduces the stress on your kidneys.  You may take your regular medicines.    If you are on dialysis: It is best to have dialysis today.    If you have a reaction: Most reactions happen right away. If you have  any new symptoms after leaving the hospital (such as hives or swelling),  call your hospital at the correct number below. Or call your family doctor.  If you have breathing distress or wheezing, call 911.    Special instructions: ***    I have read and understand the above information.    Signature:______________________________________ Date:___________    Staff:__________________________________________ Date:___________     Time:__________    [place] [facility] Departments:    ___[place]: [phone]  ___[facility]: [phone]  ___[place]: [phone] ___[facility]: [phone]  ___[facility]: [phone]  ___[facility]: [phone]  ___[phone]  ___[facility]: [phone]  ___[phone]  
Yes

## 2022-08-17 ENCOUNTER — TELEPHONE (OUTPATIENT)
Dept: FAMILY MEDICINE | Facility: CLINIC | Age: 42
End: 2022-08-17

## 2022-08-17 ENCOUNTER — OFFICE VISIT (OUTPATIENT)
Dept: INTERNAL MEDICINE | Facility: CLINIC | Age: 42
End: 2022-08-17
Payer: COMMERCIAL

## 2022-08-17 VITALS
TEMPERATURE: 98.4 F | BODY MASS INDEX: 48.66 KG/M2 | DIASTOLIC BLOOD PRESSURE: 98 MMHG | RESPIRATION RATE: 14 BRPM | SYSTOLIC BLOOD PRESSURE: 140 MMHG | HEART RATE: 72 BPM | WEIGHT: 315 LBS

## 2022-08-17 DIAGNOSIS — R22.42 ANKLE MASS, LEFT: ICD-10-CM

## 2022-08-17 DIAGNOSIS — M25.572 PAIN IN JOINT INVOLVING ANKLE AND FOOT, LEFT: Primary | ICD-10-CM

## 2022-08-17 PROCEDURE — 99203 OFFICE O/P NEW LOW 30 MIN: CPT | Performed by: INTERNAL MEDICINE

## 2022-08-17 NOTE — TELEPHONE ENCOUNTER
Travel questionnaire was asked. Verified that they have no signs of COVID-19 symptoms.    Patient dropped off Physician Results Form for Dr. Sin to fill out. Placed the original copies in the 's slot.    When forms are completed, patient would like it:    -Please call patient to let them know it's ready    Ok to leave a detailed message if unable to get a hold of the Patient.    Please re-route task back to the  to shred the copied forms and complete the task. Thanks!

## 2022-08-17 NOTE — PROGRESS NOTES
"  Assessment & Plan     Pain in joint involving ankle and foot, left  He did have his son run into his anterior shin with a skateboard 3 weeks ago.  We will do an x-ray to make sure there is no small fracture there.  On exam there is a palpable lump and will do a nonvascular ultrasound.  There is no tenderness to palpation in his calf or medial ankle or calf, so my suspicion for a clot is less likely.    Discussed use ibuprofen as needed for pain keep, and use compression for possible fluid collection/cyst post contusion.    - XR Ankle Left G/E 3 Views; Future    Ankle mass, left  As above.  Based on results of testing will determine course you will need to see for specialty care.  - US Lower Extremity Non Vascular Left; Future  0956}     BMI:   Estimated body mass index is 48.66 kg/m  as calculated from the following:    Height as of 1/26/22: 1.85 m (6' 0.84\").    Weight as of this encounter: 166.5 kg (367 lb 2 oz).     Return for Follow up PCP 2 weeks.    Pati Pacheco MD  [facility]    Subjective   Lionel is a 41 year old, presenting for the following health issues:  Pain (Left ankle injury [id number]-pain comes and goes, painful, sensitive to touch, mild right ankle pain)    Lionel is an overweight 41-year-old male with history of high blood pressure and asthma who is currently here for acute visit due to increasing pain in his left ankle.      He sustained a left ankle injury about 3 weeks ago.  His 6-year-old son was learning how to skateboard and accidentally ran into his anterior ankle off the ramp.  Patient did experience pain initially and took some ibuprofen and it was subsiding however most recently it has gotten worse where it is more noticeable when he bears weight on it.  He switched from flip-flops to tennis shoes which helped.  He also felt that there was more swelling in front of the ankle.    Pain    History of Present Illness       Reason for visit:  Ankle  Symptom " onset:  3-4 weeks ago  Symptoms include:  Pain in ankles  Symptom intensity:  Moderate  Symptom progression:  Worsening  Had these symptoms before:  No  What makes it worse:  Walking  What makes it better:  Sitting    He eats 0-1 servings of fruits and vegetables daily.He consumes 3 sweetened beverage(s) daily.He exercises with enough effort to increase his heart rate 10 to 19 minutes per day.  He exercises with enough effort to increase his heart rate 3 or less days per week.   He is taking medications regularly.       Review of Systems   As above, no bruising or redness or pain in the skin over the area.      Objective    BP (!) 140/98   Pulse 72   Temp 98.4  F (36.9  C) (Oral)   Resp 14   Wt (!) 166.5 kg (367 lb 2 oz)   BMI 48.66 kg/m    Body mass index is 48.66 kg/m .  Physical Exam   General: well appearing male, alert and oriented x3  EYES: Eyelids, conjunctiva, and sclera were normal. Pupils were normal.     RESPIRATORY: respirations non labored, CTA bl, no wheezes, rales, no forced expiratory wheezing.  CARDIOVASCULAR: Heart rate and rhythm were normal. No murmurs, rubs,gallops. There was no peripheral edema.    MUSCULOSKELETAL: Muscle mass was normal for age. No joint synovitis or deformity.  Has full range of motion in his left ankle, no medial or lateral malleoli tenderness or joint line tenderness, however anteriorly above the ankle in the center and slightly medially there is a palpable hard lump.  There is no overlying cellulitis or skin changes over it.  SKIN/HAIR/NAILS: Skin color was normal.  No rashes.  NEUROLOGIC: The patient was alert and oriented.  Speech was normal.    PSYCHIATRIC:  Mood and affect were normal.         .  ..

## 2022-08-17 NOTE — PATIENT INSTRUCTIONS
For ankle: will check XR to check ankle joint and US to check the painful lump.    Can try Ibuprofen 400 mg 2 times a day as needed for pain    Schedule follow up with PCP for b/p control.

## 2022-08-22 ENCOUNTER — VIRTUAL VISIT (OUTPATIENT)
Dept: NEUROSURGERY | Facility: CLINIC | Age: 42
End: 2022-08-22
Payer: COMMERCIAL

## 2022-08-22 DIAGNOSIS — G25.9 LESION OF BASAL GANGLIA: Primary | ICD-10-CM

## 2022-08-22 PROCEDURE — 99213 OFFICE O/P EST LOW 20 MIN: CPT | Mod: TEL | Performed by: NEUROLOGICAL SURGERY

## 2022-08-22 NOTE — PROGRESS NOTES
Lionel is a 41 year old who is being evaluated via a billable phone visit.      Visit duration: 15 min    Please see dictation for visit details.

## 2022-08-22 NOTE — LETTER
2022       RE: Nancy Espitia Jr.  [address]  [place] [address]     Dear Colleague,    Thank you for referring your patient, Nancy Espitia Jr., to the [facility] NEUROSURGERY CLINIC [place] at [facility]. Please see a copy of my visit note below.    Service Date: 2022    Tashi Sin MD  [address]  [address]    RE:  Nancy Espitia  MRN:  [id number]  :  1980    Dear Dr. Sin:    I had the pleasure to talk to Nancy today by telephone during a neurosurgical phone clinic visit.    Briefly, Nancy is a 41-year-old gentleman who in the  was diagnosed with a benign brain tumor while living in [place].  We have never been able to obtain the pathology records, but he was told that this, without any question, is a benign brain tumor.  He had serial imaging while he was living down in [place], and this was always stable.  He subsequently has moved to [place] and I have been following him now for the last year.  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  This was smaller than what was seen on 2017.  Last year when I saw him, I recommended a repeat MRI in 1 year.  The previous lesion is again seen and is unchanged in size and appearance.    At this point in time, since this is a benign lesion that has not been growing, Nancy is comfortable doing the MRI with and without contrast in 2 years.  I think this is reasonable.  We will see him in 2 years.    Sincerely,    Andrei Peña MD        D: 2022   T: 2022   MT: [email or web address]    Name:     NANCY ESPITIA  MRN:      [id number]        Account:      [id number]   :      1980           Service Date: 2022       Document: [id number]    Nancy is a 41 year old who is being evaluated via a billable phone visit.       Visit duration: 15 min    Please see dictation for visit details.

## 2022-08-22 NOTE — NURSING NOTE
Chief Complaint   Patient presents with     Video Visit     1 yr follow up      Unable to assess any details such as vitals/phq2. Patient did not answer telephone call to be checked in- but per Dr. Peña's note patient was seen earlier this AM.

## 2022-08-23 ENCOUNTER — HOSPITAL ENCOUNTER (OUTPATIENT)
Dept: ULTRASOUND IMAGING | Facility: CLINIC | Age: 42
Discharge: HOME | End: 2022-08-23
Attending: INTERNAL MEDICINE | Admitting: INTERNAL MEDICINE
Payer: COMMERCIAL

## 2022-08-23 DIAGNOSIS — R22.42 ANKLE MASS, LEFT: ICD-10-CM

## 2022-08-23 PROCEDURE — 76882 US LMTD JT/FCL EVL NVASC XTR: CPT | Mod: LT

## 2022-08-24 NOTE — PROGRESS NOTES
Service Date: 2022    Tashi Sin MD  [address]  [address]    RE:  Nancy Espitia  MRN:  [id number]  :  1980    Dear Dr. Sin:    I had the pleasure to talk to Nancy today by telephone during a neurosurgical phone clinic visit.    Briefly, Nancy is a 41-year-old gentleman who in the  was diagnosed with a benign brain tumor while living in [place].  We have never been able to obtain the pathology records, but he was told that this, without any question, is a benign brain tumor.  He had serial imaging while he was living down in [place], and this was always stable.  He subsequently has moved to [place] and I have been following him now for the last year.  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  This was smaller than what was seen on 2017.  Last year when I saw him, I recommended a repeat MRI in 1 year.  The previous lesion is again seen and is unchanged in size and appearance.    At this point in time, since this is a benign lesion that has not been growing, Nancy is comfortable doing the MRI with and without contrast in 2 years.  I think this is reasonable.  We will see him in 2 years.    Sincerely,    Andrei Peña MD        D: 2022   T: 2022   MT: [email or web address]    Name:     NANCY ESPITIA  MRN:      [id number]        Account:      [id number]   :      1980           Service Date: 2022       Document: [id number]

## 2022-10-06 DIAGNOSIS — E66.9 OBESITY: Primary | ICD-10-CM

## 2022-10-14 ENCOUNTER — LAB (OUTPATIENT)
Dept: LAB | Facility: CLINIC | Age: 42
End: 2022-10-14
Payer: COMMERCIAL

## 2022-10-14 DIAGNOSIS — E66.9 OBESITY: ICD-10-CM

## 2022-10-14 LAB
ALBUMIN SERPL BCG-MCNC: 4.3 G/DL (ref 3.5–5.2)
ALP SERPL-CCNC: 83 U/L (ref 40–129)
ALT SERPL W P-5'-P-CCNC: 23 U/L (ref 10–50)
ANION GAP SERPL CALCULATED.3IONS-SCNC: 9 MMOL/L (ref 7–15)
AST SERPL W P-5'-P-CCNC: 20 U/L (ref 10–50)
BILIRUB SERPL-MCNC: 0.3 MG/DL
BUN SERPL-MCNC: 11.6 MG/DL (ref 6–20)
CALCIUM SERPL-MCNC: 9.6 MG/DL (ref 8.6–10)
CHLORIDE SERPL-SCNC: 104 MMOL/L (ref 98–107)
CHOLEST SERPL-MCNC: 155 MG/DL
CREAT SERPL-MCNC: 1 MG/DL (ref 0.67–1.17)
DEPRECATED HCO3 PLAS-SCNC: 29 MMOL/L (ref 22–29)
ERYTHROCYTE [DISTWIDTH] IN BLOOD BY AUTOMATED COUNT: 13 % (ref 10–15)
GFR SERPL CREATININE-BSD FRML MDRD: >90 ML/MIN/1.73M2
GLUCOSE SERPL-MCNC: 105 MG/DL (ref 70–99)
HBA1C MFR BLD: 6.1 % (ref 0–5.6)
HCT VFR BLD AUTO: 43.2 % (ref 40–53)
HDLC SERPL-MCNC: 47 MG/DL
HGB BLD-MCNC: 13.7 G/DL (ref 13.3–17.7)
LDLC SERPL CALC-MCNC: 97 MG/DL
MCH RBC QN AUTO: 26.3 PG (ref 26.5–33)
MCHC RBC AUTO-ENTMCNC: 31.7 G/DL (ref 31.5–36.5)
MCV RBC AUTO: 83 FL (ref 78–100)
NONHDLC SERPL-MCNC: 108 MG/DL
PLATELET # BLD AUTO: 221 10E3/UL (ref 150–450)
POTASSIUM SERPL-SCNC: 4 MMOL/L (ref 3.4–5.3)
PROT SERPL-MCNC: 7.2 G/DL (ref 6.4–8.3)
RBC # BLD AUTO: 5.2 10E6/UL (ref 4.4–5.9)
SODIUM SERPL-SCNC: 142 MMOL/L (ref 136–145)
TRIGL SERPL-MCNC: 54 MG/DL
TSH SERPL DL<=0.005 MIU/L-ACNC: 2.15 UIU/ML (ref 0.3–4.2)
VIT B12 SERPL-MCNC: 1111 PG/ML (ref 232–1245)
WBC # BLD AUTO: 4.4 10E3/UL (ref 4–11)

## 2022-10-14 PROCEDURE — 85027 COMPLETE CBC AUTOMATED: CPT

## 2022-10-14 PROCEDURE — 80061 LIPID PANEL: CPT

## 2022-10-14 PROCEDURE — 80053 COMPREHEN METABOLIC PANEL: CPT

## 2022-10-14 PROCEDURE — 84443 ASSAY THYROID STIM HORMONE: CPT

## 2022-10-14 PROCEDURE — 36415 COLL VENOUS BLD VENIPUNCTURE: CPT

## 2022-10-14 PROCEDURE — 82607 VITAMIN B-12: CPT

## 2022-10-14 PROCEDURE — 83036 HEMOGLOBIN GLYCOSYLATED A1C: CPT

## 2022-11-09 DIAGNOSIS — I10 ESSENTIAL HYPERTENSION: ICD-10-CM

## 2022-11-09 DIAGNOSIS — Z76.0 ENCOUNTER FOR MEDICATION REFILL: ICD-10-CM

## 2022-11-09 RX ORDER — AMLODIPINE BESYLATE 10 MG/1
10 TABLET ORAL DAILY
Qty: 90 TABLET | Refills: 1 | Status: CANCELLED | OUTPATIENT
Start: 2022-11-09

## 2022-11-09 RX ORDER — HYDROCHLOROTHIAZIDE 25 MG/1
25 TABLET ORAL DAILY
Qty: 90 TABLET | Refills: 3 | Status: CANCELLED | OUTPATIENT
Start: 2022-11-09

## 2022-11-11 ENCOUNTER — MYC MEDICAL ADVICE (OUTPATIENT)
Dept: FAMILY MEDICINE | Facility: CLINIC | Age: 42
End: 2022-11-11

## 2022-11-11 DIAGNOSIS — I10 ESSENTIAL HYPERTENSION: ICD-10-CM

## 2022-11-11 DIAGNOSIS — Z76.0 ENCOUNTER FOR MEDICATION REFILL: ICD-10-CM

## 2022-11-11 RX ORDER — HYDROCHLOROTHIAZIDE 25 MG/1
25 TABLET ORAL DAILY
Qty: 90 TABLET | Refills: 3 | Status: SHIPPED | OUTPATIENT
Start: 2022-11-11 | End: 2023-12-15

## 2022-11-11 RX ORDER — AMLODIPINE BESYLATE 10 MG/1
10 TABLET ORAL DAILY
Qty: 90 TABLET | Refills: 1 | Status: SHIPPED | OUTPATIENT
Start: 2022-11-11 | End: 2023-12-15

## 2022-11-11 NOTE — TELEPHONE ENCOUNTER
"Routing refill request to provider for review/approval because:  A break in medication  Patient needs to be seen because it has been more than 6 months since last office visit.  ACT score  Medication previously prescribed by ER MD- please review for continued use    script     Last Written Prescription Date:  21  Last Fill Quantity: 1,  # refills: 0   Last office visit provider:  22     Requested Prescriptions   Pending Prescriptions Disp Refills     albuterol (PROAIR HFA/PROVENTIL HFA/VENTOLIN HFA) 108 (90 Base) MCG/ACT inhaler       Sig: Inhale 2 puffs into the lungs every 6 hours as needed for shortness of breath / dyspnea or wheezing       Asthma Maintenance Inhalers - Anticholinergics Failed - 2022  8:21 AM        Failed - Asthma control assessment score within normal limits in last 6 months     Please review ACT score.           Failed - Recent (6 mo) or future (30 days) visit within the authorizing provider's specialty     Patient had office visit in the last 6 months or has a visit in the next 30 days with authorizing provider or within the authorizing provider's specialty.  See \"Patient Info\" tab in inbasket, or \"Choose Columns\" in Meds & Orders section of the refill encounter.            Passed - Patient is age 12 years or older        Passed - Medication is active on med list       Short-Acting Beta Agonist Inhalers Protocol  Failed - 2022  8:21 AM        Failed - Asthma control assessment score within normal limits in last 6 months     Please review ACT score.           Failed - Recent (6 mo) or future (30 days) visit within the authorizing provider's specialty     Patient had office visit in the last 6 months or has a visit in the next 30 days with authorizing provider or within the authorizing provider's specialty.  See \"Patient Info\" tab in inbasket, or \"Choose Columns\" in Meds & Orders section of the refill encounter.            Passed - Patient is age 12 or older        " Passed - Medication is active on med list             Jania Headley RN 11/11/22 11:13 AM

## 2022-11-14 NOTE — TELEPHONE ENCOUNTER
Patient needs to be seen because it has been more than 6 months since last office visit.  ACT score  Medication previously prescribed by ER MD- please review for continued use    script

## 2022-11-17 RX ORDER — ALBUTEROL SULFATE 90 UG/1
2 AEROSOL, METERED RESPIRATORY (INHALATION) EVERY 6 HOURS PRN
Qty: 18 G | Refills: 1 | Status: SHIPPED | OUTPATIENT
Start: 2022-11-17

## 2023-02-13 ENCOUNTER — OFFICE VISIT (OUTPATIENT)
Dept: FAMILY MEDICINE | Facility: CLINIC | Age: 43
End: 2023-02-13
Payer: COMMERCIAL

## 2023-02-13 VITALS
HEART RATE: 80 BPM | WEIGHT: 315 LBS | DIASTOLIC BLOOD PRESSURE: 70 MMHG | OXYGEN SATURATION: 98 % | RESPIRATION RATE: 17 BRPM | SYSTOLIC BLOOD PRESSURE: 140 MMHG | BODY MASS INDEX: 47.58 KG/M2

## 2023-02-13 DIAGNOSIS — D33.2 BENIGN NEOPLASM OF BRAIN, UNSPECIFIED BRAIN REGION (H): ICD-10-CM

## 2023-02-13 DIAGNOSIS — E55.9 VITAMIN D DEFICIENCY: ICD-10-CM

## 2023-02-13 DIAGNOSIS — Z12.5 SCREENING FOR PROSTATE CANCER: ICD-10-CM

## 2023-02-13 DIAGNOSIS — Z00.00 ROUTINE GENERAL MEDICAL EXAMINATION AT A HEALTH CARE FACILITY: Primary | ICD-10-CM

## 2023-02-13 DIAGNOSIS — I10 ESSENTIAL HYPERTENSION: ICD-10-CM

## 2023-02-13 DIAGNOSIS — E66.01 MORBID OBESITY (H): ICD-10-CM

## 2023-02-13 DIAGNOSIS — R73.01 IMPAIRED FASTING BLOOD SUGAR: ICD-10-CM

## 2023-02-13 LAB
ANION GAP SERPL CALCULATED.3IONS-SCNC: 12 MMOL/L (ref 7–15)
BUN SERPL-MCNC: 12.2 MG/DL (ref 6–20)
CALCIUM SERPL-MCNC: 9.7 MG/DL (ref 8.6–10)
CHLORIDE SERPL-SCNC: 104 MMOL/L (ref 98–107)
CHOLEST SERPL-MCNC: 154 MG/DL
CREAT SERPL-MCNC: 1.03 MG/DL (ref 0.67–1.17)
DEPRECATED CALCIDIOL+CALCIFEROL SERPL-MC: 11 UG/L (ref 20–75)
DEPRECATED HCO3 PLAS-SCNC: 24 MMOL/L (ref 22–29)
GFR SERPL CREATININE-BSD FRML MDRD: >90 ML/MIN/1.73M2
GLUCOSE SERPL-MCNC: 92 MG/DL (ref 70–99)
HBA1C MFR BLD: 6.1 % (ref 0–5.6)
HDLC SERPL-MCNC: 45 MG/DL
LDLC SERPL CALC-MCNC: 97 MG/DL
NONHDLC SERPL-MCNC: 109 MG/DL
POTASSIUM SERPL-SCNC: 4 MMOL/L (ref 3.4–5.3)
PSA SERPL-MCNC: 0.65 NG/ML (ref 0–2.5)
SHBG SERPL-SCNC: 42 NMOL/L (ref 11–80)
SODIUM SERPL-SCNC: 140 MMOL/L (ref 136–145)
TRIGL SERPL-MCNC: 58 MG/DL

## 2023-02-13 PROCEDURE — 99396 PREV VISIT EST AGE 40-64: CPT | Performed by: FAMILY MEDICINE

## 2023-02-13 PROCEDURE — 99214 OFFICE O/P EST MOD 30 MIN: CPT | Mod: 25 | Performed by: FAMILY MEDICINE

## 2023-02-13 PROCEDURE — 80048 BASIC METABOLIC PNL TOTAL CA: CPT | Performed by: FAMILY MEDICINE

## 2023-02-13 PROCEDURE — 82306 VITAMIN D 25 HYDROXY: CPT | Performed by: FAMILY MEDICINE

## 2023-02-13 PROCEDURE — 84403 ASSAY OF TOTAL TESTOSTERONE: CPT | Performed by: FAMILY MEDICINE

## 2023-02-13 PROCEDURE — G0103 PSA SCREENING: HCPCS | Performed by: FAMILY MEDICINE

## 2023-02-13 PROCEDURE — 83036 HEMOGLOBIN GLYCOSYLATED A1C: CPT | Performed by: FAMILY MEDICINE

## 2023-02-13 PROCEDURE — 80061 LIPID PANEL: CPT | Performed by: FAMILY MEDICINE

## 2023-02-13 PROCEDURE — 36415 COLL VENOUS BLD VENIPUNCTURE: CPT | Performed by: FAMILY MEDICINE

## 2023-02-13 PROCEDURE — 84270 ASSAY OF SEX HORMONE GLOBUL: CPT | Performed by: FAMILY MEDICINE

## 2023-02-13 RX ORDER — METFORMIN HCL 500 MG
TABLET, EXTENDED RELEASE 24 HR ORAL
COMMUNITY
Start: 2022-11-07

## 2023-02-13 ASSESSMENT — ENCOUNTER SYMPTOMS
HEADACHES: 0
DIARRHEA: 0
PALPITATIONS: 0
COUGH: 0
HEMATURIA: 0
CHILLS: 0
MYALGIAS: 0
HEARTBURN: 0
EYE PAIN: 0
ARTHRALGIAS: 0
PARESTHESIAS: 0
NAUSEA: 0
ABDOMINAL PAIN: 0
CONSTIPATION: 0
FREQUENCY: 0
FEVER: 0
SORE THROAT: 1
WEAKNESS: 0
DIZZINESS: 0
SHORTNESS OF BREATH: 0
DYSURIA: 0
NERVOUS/ANXIOUS: 0
HEMATOCHEZIA: 0
JOINT SWELLING: 0

## 2023-02-13 NOTE — PROGRESS NOTES
SUBJECTIVE:   CC: Lionel is an 42 year old who presents for preventative health visit.        Patient has been advised of split billing requirements and indicates understanding: Yes  Healthy Habits:     Getting at least 3 servings of Calcium per day:  Yes    Bi-annual eye exam:  Yes    Dental care twice a year:  Yes    Sleep apnea or symptoms of sleep apnea:  Daytime drowsiness and Excessive snoring    Diet:  Regular (no restrictions)    Frequency of exercise:  None    Taking medications regularly:  Yes    Medication side effects:  Not applicable    PHQ-2 Total Score: 2    Additional concerns today:  No    Here for physical.  Would like to get all of his routine screening arranged.  Patient with concerns of wanting testosterone and vitamin D checked.  Would like to have some help with weight loss.  Wife was signing him up for some medical weight loss help through her workplace.  He would like to lose weight and get off blood pressure medicine.  They had recommended metformin.  He has not started that.  It was prescribed and then he had some COVID issues and never got started.      Today's PHQ-2 Score:   PHQ-2 ( 1999 Pfizer) 2/13/2023   Q1: Little interest or pleasure in doing things 1   Q2: Feeling down, depressed or hopeless 1   PHQ-2 Score 2   PHQ-2 Total Score (12-17 Years)- Positive if 3 or more points; Administer PHQ-A if positive -   Q1: Little interest or pleasure in doing things Several days   Q2: Feeling down, depressed or hopeless Several days   PHQ-2 Score 2     Social History     Tobacco Use     Smoking status: Never     Smokeless tobacco: Never   Substance Use Topics     Alcohol use: Not Currently     Alcohol Use 2/13/2023   Prescreen: >3 drinks/day or >7 drinks/week? No     Last PSA:   Prostate Specific Antigen Screen   Date Value Ref Range Status   02/13/2023 0.65 0.00 - 2.50 ng/mL Final     BP Readings from Last 3 Encounters:   02/13/23 (!) 140/70   08/17/22 (!) 140/98   01/26/22 132/88    Wt Readings  from Last 3 Encounters:   02/13/23 (!) 162.8 kg (359 lb)   08/17/22 (!) 166.5 kg (367 lb 2 oz)   01/26/22 (!) 164.2 kg (362 lb)          Patient Active Problem List   Diagnosis     Morbid obesity (H)     Primary insomnia     Brain tumor (benign) (H)     Essential hypertension     Impaired fasting blood sugar     Lesion of basal ganglia     Past Surgical History:   Procedure Laterality Date     BIOPSY  1993     IMPLANT SHUNT VENTRICULOPERITONEAL  05/14/1993     VASECTOMY         Social History     Tobacco Use     Smoking status: Never     Smokeless tobacco: Never   Substance Use Topics     Alcohol use: Not Currently     Family History   Problem Relation Age of Onset     Diabetes Type 2  Maternal Grandmother      Diabetes Maternal Grandmother      Hypertension Maternal Grandmother      Depression Maternal Grandmother      Obesity Mother          Current Outpatient Medications   Medication Sig Dispense Refill     albuterol (PROAIR HFA/PROVENTIL HFA/VENTOLIN HFA) 108 (90 Base) MCG/ACT inhaler Inhale 2 puffs into the lungs every 6 hours as needed for shortness of breath / dyspnea or wheezing 18 g 1     amLODIPine (NORVASC) 10 MG tablet Take 1 tablet (10 mg) by mouth daily 90 tablet 1     hydrochlorothiazide (HYDRODIURIL) 25 MG tablet Take 1 tablet (25 mg) by mouth daily Unknown dosage 90 tablet 3     MELATONIN GUMMIES PO        metFORMIN (GLUCOPHAGE XR) 500 MG 24 hr tablet        Allergies   Allergen Reactions     Lisinopril Other (See Comments)     Coughing      Recent Labs   Lab Test 02/13/23  1104 10/14/22  0839 01/27/22  0907 01/27/22  0906 03/10/21  0808 03/10/21  0808 01/22/21  2330   A1C 6.1* 6.1* 6.0*  --    < > 6.0*  --    LDL 97 97  --  102   < > 102  --    HDL 45 47  --  46   < > 45  --    TRIG 58 54  --  63   < > 54  --    ALT  --  23  --   --   --   --  51   CR 1.03 1.00  --  1.14   < > 0.95 1.12   GFRESTIMATED >90 >90  --  84   < > >60 83   GFRESTBLACK  --   --   --   --   --  >60 >90   POTASSIUM 4.0 4.0   --  4.6   < > 4.0 2.9*   TSH  --  2.15  --   --   --  1.27  --     < > = values in this interval not displayed.        Reviewed and updated as needed this visit by clinical staff   Tobacco  Allergies  Meds   Med Hx  Surg Hx  Fam Hx          Reviewed and updated as needed this visit by Provider       Med Hx  Surg Hx  Fam Hx         Past Medical History:   Diagnosis Date     Hypertension      Uncomplicated asthma       Past Surgical History:   Procedure Laterality Date     BIOPSY  1993     IMPLANT SHUNT VENTRICULOPERITONEAL  05/14/1993     VASECTOMY         Review of Systems   Constitutional: Negative for chills and fever.   HENT: Positive for congestion and sore throat. Negative for ear pain and hearing loss.    Eyes: Negative for pain and visual disturbance.   Respiratory: Negative for cough and shortness of breath.    Cardiovascular: Negative for chest pain, palpitations and peripheral edema.   Gastrointestinal: Negative for abdominal pain, constipation, diarrhea, heartburn, hematochezia and nausea.   Genitourinary: Negative for dysuria, frequency, genital sores, hematuria, impotence, penile discharge and urgency.   Musculoskeletal: Negative for arthralgias, joint swelling and myalgias.   Skin: Negative for rash.   Neurological: Negative for dizziness, weakness, headaches and paresthesias.   Psychiatric/Behavioral: Negative for mood changes. The patient is not nervous/anxious.      OBJECTIVE:   BP (!) 140/70 (BP Location: Left arm, Patient Position: Sitting, Cuff Size: Adult Large)   Pulse 80   Resp 17   Wt (!) 162.8 kg (359 lb)   SpO2 98%   BMI 47.58 kg/m      Physical Exam  Gen:   Alert, not distressed  Head:   Normocephalic, without obvious abnormality, atraumatic  Eyes:   PERRL, conjunctiva/corneas clear, EOM's intact  Ears:   Normal tympanic membranes and external ear canals  Nose:    Mucosa normal, no drainage or sinus tenderness  Throat:    No erythema or exudates  Neck:    No adenopathy no  nodules in thyroid, normal ROM  Lungs:    Clear to auscultation bilaterally, respirations unlabored  Chest wall:  No tenderness or deformity  Heart:     Regular, normal S1 and S2, no murmur, gallop or rub  Abdomen:  Soft, non-tender, normal bowel sounds, no masses, no organomegaly  Back:    Symmetric, no curvature, ROM normal, no CVA tenderness  Extremities:   Extremities normal, atraumatic, no cyanosis or edema  Skin:     Skin color, texture, turgor normal, no rashes or lesions  Lymph nodes:   Cervical and supraclavicular nodes normal  Neurologic:   CNII-XII intact.   DTRs normal and symmetric.  Symmetric strength and sensation.      Diagnostic Test Results:  Labs reviewed in Epic  Results for orders placed or performed in visit on 02/13/23 (from the past 24 hour(s))   Lipid panel reflex to direct LDL Fasting   Result Value Ref Range    Cholesterol 154 <200 mg/dL    Triglycerides 58 <150 mg/dL    Direct Measure HDL 45 >=40 mg/dL    LDL Cholesterol Calculated 97 <=100 mg/dL    Non HDL Cholesterol 109 <130 mg/dL    Narrative    Cholesterol  Desirable:  <200 mg/dL    Triglycerides  Normal:  Less than 150 mg/dL  Borderline High:  150-199 mg/dL  High:  200-499 mg/dL  Very High:  Greater than or equal to 500 mg/dL    Direct Measure HDL  Female:  Greater than or equal to 50 mg/dL   Male:  Greater than or equal to 40 mg/dL    LDL Cholesterol  Desirable:  <100mg/dL  Above Desirable:  100-129 mg/dL   Borderline High:  130-159 mg/dL   High:  160-189 mg/dL   Very High:  >= 190 mg/dL    Non HDL Cholesterol  Desirable:  130 mg/dL  Above Desirable:  130-159 mg/dL  Borderline High:  160-189 mg/dL  High:  190-219 mg/dL  Very High:  Greater than or equal to 220 mg/dL   Testosterone Free and Total    Narrative    The following orders were created for panel order Testosterone Free and Total.  Procedure                               Abnormality         Status                     ---------                               -----------          ------                     Sex Hormone Binding Glob...[[id number]]                      In process                 Testosterone Free and Total[[id number]]                      In process                   Please view results for these tests on the individual orders.   Hemoglobin A1c   Result Value Ref Range    Hemoglobin A1C 6.1 (H) 0.0 - 5.6 %   PSA, screen   Result Value Ref Range    Prostate Specific Antigen Screen 0.65 0.00 - 2.50 ng/mL    Narrative    This result is obtained using the Roche Elecsys total PSA method on the jocelyn [id number] immunoassay analyzer. Results obtained with different assay methods or kits cannot be used interchangeably.   Basic metabolic panel  (Ca, Cl, CO2, Creat, Gluc, K, Na, BUN)   Result Value Ref Range    Sodium 140 136 - 145 mmol/L    Potassium 4.0 3.4 - 5.3 mmol/L    Chloride 104 98 - 107 mmol/L    Carbon Dioxide (CO2) 24 22 - 29 mmol/L    Anion Gap 12 7 - 15 mmol/L    Urea Nitrogen 12.2 6.0 - 20.0 mg/dL    Creatinine 1.03 0.67 - 1.17 mg/dL    Calcium 9.7 8.6 - 10.0 mg/dL    Glucose 92 70 - 99 mg/dL    GFR Estimate >90 >60 mL/min/1.73m2       ASSESSMENT/PLAN:   Lionel was seen today for physical.    Diagnoses and all orders for this visit:    Routine general medical examination at a health care facility    Essential hypertension  A bit above controlled range today.  Recommended rechecking versus starting new medication.  Patient wanted to see if a recheck would be in a better range before starting new medicines.  Had had trouble with lisinopril.  Could add losartan.  -     Basic metabolic panel  (Ca, Cl, CO2, Creat, Gluc, K, Na, BUN)    Impaired fasting blood sugar  Morbid obesity (H)  A1c about the same range as previous.  Discussed that metformin could be used as a weight loss medication.  I think he would do well to proceed with that and the program that his wife had recommended.  Could consider GLP-1 medication as well as does have some weight loss potential.    -     Vitamin D  Deficiency; Future  -     Testosterone Free and Total; Future  -     Hemoglobin A1c; Future      Benign neoplasm of brain, unspecified brain region (H)  Saw neurosurgery and 2022.  They are okay with proceeding with recheck of MRI in 2024.    Screening for prostate cancer  Risks, benefits, alternatives to screening with specific antigen for prostate cancer discussed.  Patient would like to proceed.  -     PSA, screen    COUNSELING:   Reviewed preventive health counseling, as reflected in patient instructions       Regular exercise       Healthy diet/nutrition       Immunizations    The patient declined: Covid-19 and Influenza        Prostate cancer screening    He reports that he has never smoked. He has never used smokeless tobacco.            Tashi Sin MD  [facility]

## 2023-02-14 ENCOUNTER — TELEPHONE (OUTPATIENT)
Dept: FAMILY MEDICINE | Facility: CLINIC | Age: 43
End: 2023-02-14
Payer: COMMERCIAL

## 2023-02-14 LAB
TESTOST FREE SERPL-MCNC: 5.32 NG/DL
TESTOST SERPL-MCNC: 313 NG/DL (ref 240–950)

## 2023-02-14 NOTE — TELEPHONE ENCOUNTER
Message  Received: Yesterday  Tashi Sin MD [facility] Care Team Pool  Please schedule nurse visit in 2 weeks for blood pressure recheck.

## 2023-02-19 RX ORDER — CHOLECALCIFEROL (VITAMIN D3) 50 MCG
1 TABLET ORAL DAILY
Qty: 90 TABLET | Refills: 1 | Status: SHIPPED | OUTPATIENT
Start: 2023-02-19

## 2023-03-22 ENCOUNTER — MYC MEDICAL ADVICE (OUTPATIENT)
Dept: FAMILY MEDICINE | Facility: CLINIC | Age: 43
End: 2023-03-22
Payer: COMMERCIAL

## 2023-03-22 ENCOUNTER — TELEPHONE (OUTPATIENT)
Dept: FAMILY MEDICINE | Facility: CLINIC | Age: 43
End: 2023-03-22

## 2023-03-22 DIAGNOSIS — R41.840 INATTENTION: ICD-10-CM

## 2023-03-22 DIAGNOSIS — E66.01 MORBID OBESITY (H): Primary | ICD-10-CM

## 2023-03-23 NOTE — TELEPHONE ENCOUNTER
See [facility] message, the pt was informed this was done and placed at the [facility].     Copy placed in scanning. Sent to scheduling to complete task.

## 2023-04-13 ENCOUNTER — VIRTUAL VISIT (OUTPATIENT)
Dept: PSYCHOLOGY | Facility: CLINIC | Age: 43
End: 2023-04-13
Attending: FAMILY MEDICINE
Payer: COMMERCIAL

## 2023-04-13 DIAGNOSIS — F90.0 ATTENTION DEFICIT HYPERACTIVITY DISORDER, INATTENTIVE TYPE: Primary | ICD-10-CM

## 2023-04-13 PROCEDURE — 90834 PSYTX W PT 45 MINUTES: CPT | Mod: VID | Performed by: PSYCHOLOGIST

## 2023-04-17 NOTE — PROGRESS NOTES
"[facility]   Mental Health & Addiction Services     Progress Note - Initial Visit    Patient  Name:  Nancy Espitia Jr. Date: 23         Service Type: Individual     Visit Start Time: 1:00pm Visit End Time: 1:52pm    Visit #: 1 52 minutes    Attendees: Client attended alone    Service Modality:  Video Visit:      Provider verified identity through the following two step process.  Patient provided:  Patient     Telemedicine Visit: The patient's condition can be safely assessed and treated via synchronous audio and visual telemedicine encounter.      Reason for Telemedicine Visit: Services only offered telehealth    Originating Site (Patient Location): Patient's home    Distant Site (Provider Location): Provider Remote Setting- Home Office    Consent:  The patient/guardian has verbally consented to: the potential risks and benefits of telemedicine (video visit) versus in person care; bill my insurance or make self-payment for services provided; and responsibility for payment of non-covered services.     Patient would like the video invitation sent by:  Send to e-mail at: [email or web address]    Mode of Communication:  Video Conference via [facility]    Distant Location (Provider):  Off-site    As the provider I attest to compliance with applicable laws and regulations related to telemedicine.       DATA:   Interactive Complexity: No   Crisis: No     Presenting Concerns/  Current Stressors:   \"Not being able to focus, finish a task, doing five things at once and not finishing them.\"      ASSESSMENT:  Mental Status Assessment:  Appearance:   Appropriate   Eye Contact:   Fair   Psychomotor Behavior: Normal   Attitude:   Cooperative  Pleasant Guarded   Orientation:   All  Speech   Rate / Production: Normal/ Responsive   Volume:  Normal   Mood:    Normal  Affect:    Appropriate   Thought Content:  Clear   Thought Form:  Coherent   Insight:    Fair       Safety Issues and Plan for Safety and Risk " "Management:   [place] Suicide Severity Rating Scale (Lifetime/Recent)      4/13/2023     1:15 PM   [place] Suicide Severity Rating (Lifetime/Recent)   1. Wish to be Dead (Lifetime) N   2. Non-Specific Active Suicidal Thoughts (Lifetime) Y   Non-Specific Active Suicidal Thought Description (Lifetime) MRE: \"A couple years ago.  I don't remember what the thought was.\"   2. Non-Specific Active Suicidal Thoughts (Past 1 Month) N   3. Active Suicidal Ideation with any Methods (Not Plan) Without Intent to Act (Lifetime) N   4. Active Suicidal Ideation with Some Intent to Act, Without Specific Plan (Lifetime) N   5. Active Suicidal Ideation with Specific Plan and Intent (Lifetime) N   Most Severe Ideation Rating (Lifetime) 2   Frequency (Lifetime) 1   Duration (Lifetime) 1   Controllability (Lifetime) 1   Deterrents (Lifetime) 1   Deterrents (Past 1 Month) 0   Reasons for Ideation (Lifetime) 5   Reasons for Ideation (Past 1 Month) 0   Actual Attempt (Lifetime) N   Has subject engaged in non-suicidal self-injurious behavior? (Lifetime) N   Interrupted Attempts (Lifetime) N   Aborted or Self-Interrupted Attempt (Lifetime) N   Preparatory Acts or Behavior (Lifetime) N   Calculated C-SSRS Risk Score (Lifetime/Recent) No Risk Indicated     Patient denies current fears or concerns for personal safety.  Patient denies current or recent suicidal ideation or behaviors.  Patient denies current or recent homicidal ideation or behaviors.  Patient denies current or recent self injurious behavior or ideation.  Patient denies other safety concerns.  Recommended that patient call 911 or go to the local ED should there be a change in any of these risk factors.  Patient reports there are no firearms in the house.     Diagnostic Criteria:  Attention Deficit Hyperactivity Disorder  A) A persistent pattern of inattention and/or hyperactivity-impulsivity that interferes with functioning or development, as characterized by (1) Inattention " and/or (2) Hyperactivity and Impulsivity  - Often has difficulty sustaining attention in tasks or play activities  - Is often easily distractedby extraneous stimuli  - Is often forgetful in daily activities  E) The Symptoms do not occur exclusively during the course of schizophrenia or another psychotic disorder and are not better explained by another mental disorder      DSM5 Diagnoses: (Sustained by DSM5 Criteria Listed Above)  Diagnoses: Attention-Deficit/Hyperactivity Disorder  314.00 (F90.0) Predominantly inattentive presentation (Provisional)  Psychosocial & Contextual Factors: Client had brain surgery as a child to remove a tumor(s).  Assessments completed prior to visit:  The following assessments were completed by patient for this visit:  PHQ9:       1/27/2021     9:00 AM 4/13/2023     1:07 PM   PHQ-9 SCORE   PHQ-9 Total Score 0 17     GAD7:       4/13/2023     1:07 PM   BRITTNEY-7 SCORE   Total Score 15     CAGE-AID:       4/13/2023     1:07 PM   CAGE-AID Total Score   Total Score 0     PROMIS 10-Global Health (all questions and answers displayed):       4/13/2023     1:21 PM   PROMIS 10   In general, would you say your health is: 3   In general, would you say your quality of life is: 5   In general, how would you rate your physical health? 2   In general, how would you rate your mental health, including your mood and your ability to think? 1   In general, how would you rate your satisfaction with your social activities and relationships? 2   In general, please rate how well you carry out your usual social activities and roles. (This includes activities at home, at work and in your community, and responsibilities as a parent, child, spouse, employee, friend, etc.) 4   To what extent are you able to carry out your everyday physical activities such as walking, climbing stairs, carrying groceries, or moving a chair? 4   In the past 7 days, how often have you been bothered by emotional problems such as feeling  "anxious, depressed, or irritable? 4   In the past 7 days, how would you rate your fatigue on average? 4   In the past 7 days, how would you rate your pain on average, where 0 means no pain, and 10 means worst imaginable pain? 0   Global Mental Health Score 10   Global Physical Health Score 13   PROMIS TOTAL - SUBSCORES 23     [place] Suicide Severity Rating Scale (Lifetime/Recent)      4/13/2023     1:15 PM   [place] Suicide Severity Rating (Lifetime/Recent)   1. Wish to be Dead (Lifetime) N   2. Non-Specific Active Suicidal Thoughts (Lifetime) Y   Non-Specific Active Suicidal Thought Description (Lifetime) MRE: \"A couple years ago.  I don't remember what the thought was.\"   2. Non-Specific Active Suicidal Thoughts (Past 1 Month) N   3. Active Suicidal Ideation with any Methods (Not Plan) Without Intent to Act (Lifetime) N   4. Active Suicidal Ideation with Some Intent to Act, Without Specific Plan (Lifetime) N   5. Active Suicidal Ideation with Specific Plan and Intent (Lifetime) N   Most Severe Ideation Rating (Lifetime) 2   Frequency (Lifetime) 1   Duration (Lifetime) 1   Controllability (Lifetime) 1   Deterrents (Lifetime) 1   Deterrents (Past 1 Month) 0   Reasons for Ideation (Lifetime) 5   Reasons for Ideation (Past 1 Month) 0   Actual Attempt (Lifetime) N   Has subject engaged in non-suicidal self-injurious behavior? (Lifetime) N   Interrupted Attempts (Lifetime) N   Aborted or Self-Interrupted Attempt (Lifetime) N   Preparatory Acts or Behavior (Lifetime) N   Calculated C-SSRS Risk Score (Lifetime/Recent) No Risk Indicated       WHODAS 2.0 (12 item):        View : No data to display.              Intervention:   Psychoeducation; Skill review/identification & recommendation; Pattern recognition; Socratic Questioning; Active listening, validation, and other rapport building skills; Administer and explain screeners; Answered client's questions  Collateral Reports Completed:  Not Applicable      PLAN: " (Homework, other):  1. Provider will continue Diagnostic Assessment.  Patient was given the following to do until next session:  The client was sent the MMPI-2 to complete and return by next session, if possible.    2. Provider recommended the following referrals: None at this time.      3.  Suicide Risk and Safety Concerns were assessed for Lionel Espitia Jr..    Patient meets the following risk assessment and triage: Patient denied any current/recent/lifetime history of suicidal ideation and/or behaviors.  No safety plan indicated at this time.       Neftali Joel PsyD  April 17, 2023

## 2023-04-20 ENCOUNTER — MYC MEDICAL ADVICE (OUTPATIENT)
Dept: ENDOCRINOLOGY | Facility: CLINIC | Age: 43
End: 2023-04-20
Payer: COMMERCIAL

## 2023-04-20 ENCOUNTER — TELEPHONE (OUTPATIENT)
Dept: ENDOCRINOLOGY | Facility: CLINIC | Age: 43
End: 2023-04-20
Payer: COMMERCIAL

## 2023-04-23 ENCOUNTER — HEALTH MAINTENANCE LETTER (OUTPATIENT)
Age: 43
End: 2023-04-23

## 2023-04-28 ENCOUNTER — VIRTUAL VISIT (OUTPATIENT)
Dept: PSYCHOLOGY | Facility: CLINIC | Age: 43
End: 2023-04-28
Payer: COMMERCIAL

## 2023-04-28 DIAGNOSIS — F43.23 ADJUSTMENT DISORDER WITH MIXED ANXIETY AND DEPRESSED MOOD: Primary | ICD-10-CM

## 2023-04-28 DIAGNOSIS — F90.0 ATTENTION DEFICIT HYPERACTIVITY DISORDER, INATTENTIVE TYPE: ICD-10-CM

## 2023-04-28 PROCEDURE — 90791 PSYCH DIAGNOSTIC EVALUATION: CPT | Mod: VID | Performed by: PSYCHOLOGIST

## 2023-04-28 NOTE — PROGRESS NOTES
[facility]    Provider Name:  Neftali Joel PsyD     Credentials:        UNIVERSAL ADULT ADHD/Mental Health DIAGNOSTIC ASSESSMENT      Patient Name:  Lionel Espitia Jr.  Date: 23  PREFERRED NAME: Lionel  PRONOUNS:   he/him    MRN: [id number]  : 1980  ADDRESS: [address] [facility] [address]  ACCT. NUMBER:  [id number]  PREFERRED PHONE: [phone]  May we leave a program related message: Yes         Service Type: Individual      Session Start Time: 9:05am Session End Time: 10:02am     Session Length: 57 minutes    Session #: 2    Attendees: Client attended alone    Service Modality:  Video Visit:      Provider verified identity through the following two step process.  Patient provided:  Patient photo    Telemedicine Visit: The patient's condition can be safely assessed and treated via synchronous audio and visual telemedicine encounter.      Reason for Telemedicine Visit: Services only offered telehealth    Originating Site (Patient Location): Patient's home    Distant Site (Provider Location): Provider Remote Setting- Home Office    Consent:  The patient/guardian has verbally consented to: the potential risks and benefits of telemedicine (video visit) versus in person care; bill my insurance or make self-payment for services provided; and responsibility for payment of non-covered services.     Patient would like the video invitation sent by:  Send to e-mail at: [email or web address]    Mode of Communication:  Video Conference via Amwell    Distant Location (Provider):  Off-site    As the provider I attest to compliance with applicable laws and regulations related to telemedicine.    DATA  Interactive Complexity: No  Crisis: No      Identifying Information:  Patient is a 42 year old,  individual.  Patient was referred for an assessment by self.  Patient attended the session alone.    Chief Complaint:   The purpose of this evaluation is to: clarify diagnosis.  "Patient reported seeking services at this time for diagnostic assessment and recommendations for treatment.  Patient reported that has not completed a previous ADHD diagnostic assessment.  Patient has not received a previous diagnosis of ADHD. Patient reported that medication has not been prescribed medication to address these problems.     Reason for Referral:  \"I saw this video on Asya Limon, and this lady lists all these symptoms, which I said was me; Not being able to focus, finish a task, doing five things at once and not finishing them.  I'll start doing this thing and that triggers doing something else I have to finish and that leads to this task I have to finish, all this before I get back to the original task.  For example, I sat down to work, and I had a basket of laundry in a basket behind me.  I have to fold the laundry and put that away.  I notice more laundry doing that and have to take care of that laundry and put it way.  I notice the trash needs emptying, so, I empty the trash.  Than I notice my kids didn't put their toys away, so, I put the toys away before I can finally focus in my office. \"  Once in the office he can focus pretty well to get things done.  \"My brain does kind of goes from one thing to another.  I have 10 tabs open on my computer right now.  I'm not able to continue for long portions of time before having to distract myself with something else.  This goes back for as long as I can remember.  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 \"When I worked from an office I'd have to go sit in a conference room or cafeteria.  I've always had to change my scenery throughout the day.\"    Client had brain surgery when he was \"12, 13, 14 y.o.\"    Social/Family History:  Patient reported they [place] I was 15.  Moved to [place], [place] since I was 15 until my Panda in High School. After my Panda year, I went " "to [employer] I graduated in .  I've been floating around the [facility] since .\"  They were raised by  my mom  .  Parents \"were never together.  My dad  in .\"  Patient reported that their childhood was \"I think it was a pretty decent childhood.  My single mom did the best we could.  I didn't realize we were poor until later in life.\"  Patient described their current relationships with family of origin as (\"Basically I'm an only child.  I'm the oldest of 11 (no full siblings).  I don't have relationships with any of them.\"     Patient described his childhood family environment as \"I don't remember my childhood.  I had a bunch of brain tumors and surgeries as a kid.\"  As a child, patient reported that he had problems with organization and keeping track of items, misplaced or lost things, forgot school work or other items between home and school, needed frequent reminders by parents to be motivated or to complete work, displayed argumentative or oppositional behaviors and had problems managing temper with frequent emotional outbursts. Patient reported no difficulty with childhood peer relationships.     The patient describes their cultural background as \"My mom instilled values in me.  My mom was one of six kids.  I spent a lot of time with my grandma.  Think of the aunts and grandma in the kitchen, growing up in [place].  We went to [employer].  [place] in [place].  I went to an [employer] school across [facility].  My school was pretty diverse.\"  Cultural influences and impact on patient's life structure, values, norms, and healthcare: None reported.  Contextual influences on patient's health include: None reported.    These factors will be addressed in the Preliminary Treatment plan.  Patient identified their preferred language to be English. Patient reported they does not need the assistance of an  or other support involved in therapy.     Patient reported had no significant " "delays in developmental tasks.   Patient's highest education level was   High School  . Patient identified the following learning problems: \"I have a shunt from a brain tumor.\"  Modifications will not be used to assist communication in therapy.  Patient reports they are able to understand written materials.    Patient did receive tutoring services during the school years (\"I had a para who would help me get things done; extra help\"). Patient did receive special education services. Patient  reported significant behavior and discipline problems including: \"just trying to keep up the work.\" and failure to finish or complete homework. Patient did not attend post secondary school \"I tried but it was like wasting my money.\"  He got extra time for homework assignments in high school.     Patient reported the following relationship history of \"four\" committed and significant relationships.  Patient's current relationship status is  for 10 years.   Patient identified their sexual orientation as hetersexual.  Patient reported having \"We have a 7 y.o. and a 4 y.o.\" child(rory). Patient identified \"my wife, mom, \" as part of their support system.  Patient identified the quality of these relationships as \"meaningful, stable.\"      Patient's current living/housing situation involves his wife and two children. The immediate members of family and household include his wife and two children and a dog and they report that housing is stable    Patient is currently unemployed (recently let go, increasing his depression).  Patient reports their finances are obtained through wife's employment and client's consulting. The patient's work history includes: \"I worked at [employer].  Out of high school I worked at a [employer]  So, I worked at [employer], then [employer] for 1.5 yrs.  I left [employer] because they outsourced us.  I was at [employer] for 10 yrs.  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  I left because, I wouldn't call it racism, but I felt things were different.  I got let go like in July () and I've been taking some time off to figure out what I wanted to do.\"  The longest period of employment has been 10 years.  Client has been terminated from a place of employment.  Patient does not identify finances as a current stressor.      Patient reported that they have not been involved with the legal system.  Patient denied being under probation/ parole/ jurisdiction. They are not under any current court jurisdiction. .    Patient has received a 's license.  Patient has received moving violations, includin speeding tickets.  Patient reported the following driving habits: attentive and cautious.  According to client, other people are comfortable riding as passengers when he is driving.     Patient's Strengths and Limitations:  Patient identified the following strengths or resources that will help them succeed in treatment: family support and motivation. Things that may interfere with the patient's success in treatment include: none identified.     Assessments:  The following assessments were completed by patient for this visit:  PHQ9:       2021     9:00 AM 2023     1:07 PM   PHQ-9 SCORE   PHQ-9 Total Score 0 17     GAD7:       2023     1:07 PM   BRITTNEY-7 SCORE   Total Score 15     CAGE-AID:       2023     1:07 PM   CAGE-AID Total Score   Total Score 0     PROMIS 10-Global Health (all questions and answers displayed):       2023     1:21 PM 2023     8:55 AM   PROMIS 10   In general, would you say your health is:  Good   In general, would you say your quality of life is:  Good   In general, how would you rate your physical health?  Fair   In general, how would you rate your mental health, including your mood and your ability to think?  Fair   In general, how would you rate your " satisfaction with your social activities and relationships?  Good   In general, please rate how well you carry out your usual social activities and roles  Good   To what extent are you able to carry out your everyday physical activities such as walking, climbing stairs, carrying groceries, or moving a chair?  Mostly   In the past 7 days, how often have you been bothered by emotional problems such as feeling anxious, depressed, or irritable?  Often   In the past 7 days, how would you rate your fatigue on average?  Moderate   In the past 7 days, how would you rate your pain on average, where 0 means no pain, and 10 means worst imaginable pain?  0   In general, would you say your health is: 3 3   In general, would you say your quality of life is: 5 3   In general, how would you rate your physical health? 2 2   In general, how would you rate your mental health, including your mood and your ability to think? 1 2   In general, how would you rate your satisfaction with your social activities and relationships? 2 3   In general, please rate how well you carry out your usual social activities and roles. (This includes activities at home, at work and in your community, and responsibilities as a parent, child, spouse, employee, friend, etc.) 4 3   To what extent are you able to carry out your everyday physical activities such as walking, climbing stairs, carrying groceries, or moving a chair? 4 4   In the past 7 days, how often have you been bothered by emotional problems such as feeling anxious, depressed, or irritable? 4 4   In the past 7 days, how would you rate your fatigue on average? 4 3   In the past 7 days, how would you rate your pain on average, where 0 means no pain, and 10 means worst imaginable pain? 0 0   Global Mental Health Score 10 10   Global Physical Health Score 13 14   PROMIS TOTAL - SUBSCORES 23 24     Sumner Suicide Severity Rating Scale (Lifetime/Recent)      4/13/2023     1:15 PM   Sumner Suicide  "Severity Rating (Lifetime/Recent)   1. Wish to be Dead (Lifetime) N   2. Non-Specific Active Suicidal Thoughts (Lifetime) Y   Non-Specific Active Suicidal Thought Description (Lifetime) MRE: \"A couple years ago.  I don't remember what the thought was.\"   2. Non-Specific Active Suicidal Thoughts (Past 1 Month) N   3. Active Suicidal Ideation with any Methods (Not Plan) Without Intent to Act (Lifetime) N   4. Active Suicidal Ideation with Some Intent to Act, Without Specific Plan (Lifetime) N   5. Active Suicidal Ideation with Specific Plan and Intent (Lifetime) N   Most Severe Ideation Rating (Lifetime) 2   Frequency (Lifetime) 1   Duration (Lifetime) 1   Controllability (Lifetime) 1   Deterrents (Lifetime) 1   Deterrents (Past 1 Month) 0   Reasons for Ideation (Lifetime) 5   Reasons for Ideation (Past 1 Month) 0   Actual Attempt (Lifetime) N   Has subject engaged in non-suicidal self-injurious behavior? (Lifetime) N   Interrupted Attempts (Lifetime) N   Aborted or Self-Interrupted Attempt (Lifetime) N   Preparatory Acts or Behavior (Lifetime) N   Calculated C-SSRS Risk Score (Lifetime/Recent) No Risk Indicated       Personal and Family Medical History:  Patient did not report a family history of mental health concerns.  Patient reports family history includes Depression in his maternal grandmother; Diabetes in his maternal grandmother; Diabetes Type 2  in his maternal grandmother; Hypertension in his maternal grandmother; Obesity in his mother..     Patient does report Mental Health Diagnosis and/or Treatment.  Patient Patient reported the following previous diagnoses which include(s): none reported.  Patient reported symptoms began:  DEPRESSION: Recent depression \"is because I was laid off and I haven't been able to find a job.  I do have a therapist.  My current depression is situational and not long-term.\"  He has been seeing his current therapist \"probably since 2017/2018.\"  Saw therapist to \"try to be healthy.  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  Psychiatric Hospitalizations: None.  Patient denies a history of civil commitment.  Patient is receiving other mental health services.  These include therapist.         Patient has had a physical exam to rule out medical causes for current symptoms.  Date of last physical exam was within the past year. Client was encouraged to follow up with PCP if symptoms were to develop. The patient has a [facility] Primary Care Provider, who is named Tashi Sin.  Patient reports no current medical and/or dental concerns.  Patient denies any issues with pain..   There are not significant appetite / nutritional concerns / weight changes.   Patient does report a history of head injury / trauma / cognitive impairment.  (See history or chart for brain surgery as a child).    Patient reports current meds as:   No outpatient medications have been marked as taking for the 4/28/23 encounter (Appointment) with Neftali Joel PsyD.       Medication Adherence:  Patient reports  .  taking prescribed medications as prescribed.    Patient Allergies:    Allergies   Allergen Reactions     Lisinopril Other (See Comments)     Coughing        Medical History:    Past Medical History:   Diagnosis Date     Hypertension      Uncomplicated asthma          Current Mental Status Exam:   Appearance:  Appropriate    Eye Contact:  Fair   Psychomotor:  Normal       Gait / station:  Unable to observe via video session.  Attitude / Demeanor: Cooperative  Pleasant  Speech      Rate / Production: Talkative      Volume:  Normal  volume      Language:  intact  Mood:   Normal  Affect:   Appropriate    Thought Content: Clear   Thought Process: Coherent       Associations: No loosening of associations  Insight:   Good  and " "Fair   Judgment:  Intact   Orientation:  All  Attention/concentration: Good      Substance Use:  Patient did not report a family history of substance use concerns; see medical history section for details.  Patient has not received chemical dependency treatment in the past.  Patient has not ever been to detox.      Patient is not currently receiving any chemical dependency treatment. Patient reported the following problems as a result of their substance use:  No problems.    Patient denies using alcohol.  Patient denies using tobacco.  Patient denies using cannabis.  Patient reports using caffeine 1 times per day and drinks 1 at a time. Patient started using caffeine at age \"not sure\".  Patient reports using/abusing the following substance(s). Patient reported no other substance use.     Substance Use: No symptoms    Based on the negative CAGE score and clinical interview there  are not indications of drug or alcohol abuse.      Significant Losses / Trauma / Abuse / Neglect Issues:   Patient did not serve in the .  There are indications or report of significant loss, trauma, abuse or neglect issues related to: death of : \"We lost like four kids to get to one.  Then we had one and lost one to get to our second.  My grandma  in 2019 (maternal).\".  Concerns for possible neglect are not present.    Safety Assessment:   Patient denies current homicidal ideation and behaviors.  Patient denies current self-injurious ideation and behaviors.    Patient denied risk behaviors associated with substance use.  Patient denies any high risk behaviors associated with mental health symptoms.  Patient reports the following current concerns for their personal safety: None.  Patient reports there no firearms in the house.    History of Safety Concerns:  Patient denied a history of homicidal ideation.     Patient denied a history of personal safety concerns.    Patient denied a history of assaultive behaviors.    Patient " "denied a history of sexual assault behaviors.     Patient denied a history of risk behaviors associated with substance use.  Patient denies any history of high risk behaviors associated with mental health symptoms.  Patient reports the following protective factors:      Risk Plan:  See Recommendations for Safety and Risk Management Plan    Review of Symptoms per patient report:   Depression: Change in sleep, Lack of interest, Change in energy level, Difficulties concentrating, Change in appetite, Psychomotor slowing or agitation, Irritability, Feeling sad, down, or depressed and Withdrawn  Wendy:  No Symptoms  Psychosis: \"I just go superior hearing.\"  Anxiety: Excessive worry, Nervousness, Psychomotor agitation, Irritability and Feeling afraid; Trouble relaxing  Panic:  No symptoms  Post Traumatic Stress Disorder:  No Symptoms   Eating Disorder: No Symptoms  ADD / ADHD:  Poor task completion, Poor organizational skills and Distractibility  Conduct Disorder: No symptoms  Autism Spectrum Disorder: No symptoms  Obsessive Compulsive Disorder: No Symptoms    Patient reports the following compulsive behaviors and treatment history: None reported.      Diagnostic Criteria:   Adjustment Disorder  A. The development of emotional or behavioral symptoms in response to an identifiable stressor(s) occurring within 3 months of the onset of the stressor(s)  B. These symptoms or behaviors are clinically significant, as evidenced by one or both of the following:       - Significant impairment in social, occupational, or other important areas of functioning  C. The stress-related disturbance does not meet criteria for another disorder & is not not an exacerbation of another mental disorder  D. The symptoms do not represent normal bereavement  E. Once the stressor or its consequences have terminated, the symptoms do not persist for more than an additional 6 months       * Adjustment Disorder with Mixed Anxiety and Depressed Mood: The " predominant manifestation is a combination of depression and anxiety    Functional Status:  Patient reports the following functional impairments:  educational activities, management of the household and or completion of tasks, relationship(s), social interactions and work / vocational responsibilities.     Nonprogrammatic care:  Patient is requesting basic services to address current mental health concerns.    Clinical Summary:  1. Reason for assessment: ADHD assessment/Clarify Diagnosis.  2. Psychosocial, Cultural and Contextual Factors: Client was recently let go from his job of 10 yrs, lives with his wife and two children.  3. Principal DSM5 Diagnoses  (Sustained by DSM5 Criteria Listed Above):   Adjustment Disorders  309.28 (F43.23) With mixed anxiety and depressed mood.  4. Other Diagnoses that is relevant to services:   None at this time.  5. Provisional Diagnosis:  Attention-Deficit/Hyperactivity Disorder  314.00 (F90.0) Predominantly inattentive presentation as evidenced by difficulty sustaining attention, working on several projects at once, difficulty completing tasks.  6. Prognosis: Expect Improvement.  7. Likely consequences of symptoms if not treated: If left untreated, the client is likely to struggle with reaching maximum productivity at this place of employment and maintaining a healthy social network.  8. Client strengths include:  goal-focused, good listener, motivated, open to learning and open to suggestions / feedback .     Recommendations:     1. Plan for Safety and Risk Management:   Safety and Risk: Recommended that patient call 911 or go to the local ED should there be a change in any of these risk factors..          Report to child / adult protection services was NA.     2. Patient's identified no cultural infuences to address at this time.     3. Initial Treatment will focus on:    ADHD Testing:  Patient was given self and collaborative rating scales to be completed prior to the next  appointment.  Depression and anxiety rating scales were completed.  Copies of (none) were requested. .     4. Resources/Service Plan:    services are not indicated.   Modifications to assist communication are not indicated.   Additional disability accommodations are not indicated.      5. Collaboration:   Collaboration / coordination of treatment will be initiated with the following  support professionals: None at this time.      6.  Referrals:   The following referral(s) will be initiated: None at this time.    Next Scheduled Appointment: Once the client has returned the completed Sharon Questionnaires, and they have been reviewed, the client will be contacted to set up their next appointment.      A Release of Information has been obtained for the following: None at this time.     Emergency Contact was confirmed.      Clinical Substantiation/medical necessity for the above recommendations:  N/A.    7. IVELISSE:    IVELISSE:  Discussed the general effects of drugs and alcohol on health and well-being. Provider gave patient printed information about the effects of chemical use on their health and well being. Recommendations:  Continue with ADHD assessment.     8. Records:   These were reviewed at time of assessment.   Information in this assessment was obtained from the medical record and provided by patient who is a good and fair historian.    Patient will have open access to their mental health medical record.    9.   Interactive Complexity: No      Provider Name/ Credentials:  Neftali Joel PsyD, DEANA  April 28, 2023

## 2023-10-17 ENCOUNTER — VIRTUAL VISIT (OUTPATIENT)
Dept: SLEEP MEDICINE | Facility: CLINIC | Age: 43
End: 2023-10-17
Attending: FAMILY MEDICINE
Payer: COMMERCIAL

## 2023-10-17 VITALS — HEIGHT: 74 IN | BODY MASS INDEX: 40.43 KG/M2 | WEIGHT: 315 LBS

## 2023-10-17 DIAGNOSIS — R06.83 SNORING: ICD-10-CM

## 2023-10-17 DIAGNOSIS — R40.0 DAYTIME SOMNOLENCE: ICD-10-CM

## 2023-10-17 DIAGNOSIS — I10 ESSENTIAL HYPERTENSION: ICD-10-CM

## 2023-10-17 DIAGNOSIS — G47.9 SLEEP DISTURBANCE: Primary | ICD-10-CM

## 2023-10-17 DIAGNOSIS — R06.81 WITNESSED APNEIC SPELLS: ICD-10-CM

## 2023-10-17 DIAGNOSIS — E66.01 MORBID OBESITY (H): ICD-10-CM

## 2023-10-17 DIAGNOSIS — G47.00 INSOMNIA, UNSPECIFIED TYPE: ICD-10-CM

## 2023-10-17 PROBLEM — J45.901 MODERATE ASTHMA WITH ACUTE EXACERBATION: Status: ACTIVE | Noted: 2017-12-29

## 2023-10-17 PROBLEM — Z98.2 HISTORY OF BRAIN SHUNT: Status: ACTIVE | Noted: 2017-12-06

## 2023-10-17 PROCEDURE — 99203 OFFICE O/P NEW LOW 30 MIN: CPT | Mod: VID | Performed by: NURSE PRACTITIONER

## 2023-10-17 ASSESSMENT — PAIN SCALES - GENERAL: PAINLEVEL: NO PAIN (0)

## 2023-10-17 ASSESSMENT — SLEEP AND FATIGUE QUESTIONNAIRES
HOW LIKELY ARE YOU TO NOD OFF OR FALL ASLEEP WHILE SITTING AND TALKING TO SOMEONE: WOULD NEVER DOZE
HOW LIKELY ARE YOU TO NOD OFF OR FALL ASLEEP WHILE SITTING QUIETLY AFTER LUNCH WITHOUT ALCOHOL: MODERATE CHANCE OF DOZING
HOW LIKELY ARE YOU TO NOD OFF OR FALL ASLEEP WHEN YOU ARE A PASSENGER IN A CAR FOR AN HOUR WITHOUT A BREAK: MODERATE CHANCE OF DOZING
HOW LIKELY ARE YOU TO NOD OFF OR FALL ASLEEP WHILE WATCHING TV: SLIGHT CHANCE OF DOZING
HOW LIKELY ARE YOU TO NOD OFF OR FALL ASLEEP IN A CAR, WHILE STOPPED FOR A FEW MINUTES IN TRAFFIC: WOULD NEVER DOZE
HOW LIKELY ARE YOU TO NOD OFF OR FALL ASLEEP WHILE SITTING AND READING: WOULD NEVER DOZE
HOW LIKELY ARE YOU TO NOD OFF OR FALL ASLEEP WHILE SITTING INACTIVE IN A PUBLIC PLACE: WOULD NEVER DOZE
HOW LIKELY ARE YOU TO NOD OFF OR FALL ASLEEP WHILE LYING DOWN TO REST IN THE AFTERNOON WHEN CIRCUMSTANCES PERMIT: HIGH CHANCE OF DOZING

## 2023-10-17 NOTE — PROGRESS NOTES
Virtual Visit Details    Type of service:  Video Visit     Originating Location (pt. Location): Home    Distant Location (provider location):  Off-site  Platform used for Video Visit: [facility]      Outpatient Sleep Medicine Consultation:      Name: Lionel Espitia Jr. MRN# [id number]   Age: 42 year old YOB: 1980     Date of Consultation: October 17, 2023  Consultation is requested by: Tashi Sin MD  [address] [place] [address] Tashi Sin  Primary care provider: Tashi Sin       Reason for Sleep Consult:     Lionel Espitia Jr. is sent by Tashi Sin for a sleep consultation regarding snoring, witnessed apneas, possible MANAN.    Patient s Reason for visit  Lionel Espitia Jr. main reason for visit: My wife says I stop breathing in my sleep multiple times a night.  Patient states problem(s) started: I've been dealing with sleep issues since 2013ish.  Lionel Espitia Jr.'s goals for this visit: Goal is to figure out how to gain a better quality of sleep.           Assessment and Plan:     Summary Sleep Diagnoses:  1. Sleep disturbance  2. Essential hypertension  3. Snoring  4. Witnessed apneic spells  5. Daytime somnolence  6. Insomnia, unspecified type  7. Morbid obesity (H)  - Adult Sleep Eval & Management  Referral  - HST-Home Sleep Apnea Test - Noxturnal Returnable; Future      Comorbid Diagnoses:  1.  Impaired fasting glucose (IFG)  2.  History of brain tumor (benign)  3.  Asthma  4.  Adjustment disorder  5.  Lesion of basal ganglia      Summary Recommendations:  1.  Recommend further evaluation with home sleep apnea testing (HST) for possible sleep disordered breathing.  This patient has a high pretest probability of sleep apnea with symptoms of snoring, witnessed apneas, daytime somnolence, sleep onset/maintenance insomnia, hypertension, and poorly restful sleep for several years.  His STOP-BANG score is 7 today which suggest a high risk of MANAN.  His symptoms are consistent with  probable obstructive sleep apnea.  2.  We discussed the pathophysiology of obstructive sleep apnea and the risks associated with untreated MANAN.  We also discussed the pros and cons of in lab polysomnography versus home sleep testing.  The patient has elected to undergo home sleep testing (HST) to further evaluate his symptoms of possible obstructive sleep apnea.  3.  All potential therapeutic options including positive airway pressure, mandibular advancing oral appliances, positional therapy, and surgical options were discussed. Also counseled about impact of weight loss on MANAN.   4.  Follow-up in approximately 2 weeks after the sleep study to review the results and to determine next steps.    Orders Placed This Encounter   Procedures    HST-Home Sleep Apnea Test - Noxturnal Returnable         Summary Counseling:    Sleep Testing Reviewed  Obstructive Sleep Apnea Reviewed  Complications of Untreated Sleep Apnea Reviewed  Previous recent chart notes and lab results reviewed    Medical Decision-making:   Educational materials provided in instructions    Total time spent reviewing medical records, history and physical examination, review of previous testing and interpretation as well as documentation on this date: 37 minutes    CC: Tashi Sin          History of Present Illness:     Lionel Espitia  is a 42-year-old male with a PMH pertinent for HTN, IFG, history of brain tumor (benign) - s/p shunt, asthma, adjustment disorder, lesion of basal ganglia, insomnia, and morbid obesity who presents today with symptoms of snoring, witnessed apneas, snorting self awake, daytime somnolence, difficulty falling/staying asleep, occasional difficulty falling back asleep once awakened and poorly restful sleep for several years.  He was referred by his primary care provider for further evaluation of possible sleep disordered breathing.    Past Sleep Evaluations: No    SLEEP-WAKE SCHEDULE:     Work/School Days: Patient goes to  school/work: Yes   Usually gets into bed at I'm in bed by 9pm but don't fall asleep until midnight.  Takes patient about It takes me hours to fall asleep. to fall asleep  Has trouble falling asleep 7 nights per week. nights per week  Wakes up in the middle of the night 7 nights per week. times.  Wakes up due to Snorting self awake;External stimuli (bed partner, pets, noise, etc);Use the bathroom  He has trouble falling back asleep 14 to 21 times a week. times a week.   It usually takes 20 minutes to an hour. to get back to sleep  Patient is usually up at Between 5:30am and 6am.  Uses alarm: Yes    Weekends/Non-work Days/All Other Days:  Usually gets into bed at Depends. Between 10:30pm and midnight.   Takes patient about Depends. Between 45min to an hour and a half. to fall asleep  Patient is usually up at Between 6am and 8am  Uses alarm: No    Sleep Need  Patient gets  Between 4 to 5 hours a night. sleep on average   Patient thinks he needs about Not sure. sleep    Lionel Espitia Jr. prefers to sleep in this position(s): Stomach   Patient states they do the following activities in bed: Read;Use phone, computer, or tablet    Naps  Patient takes a purposeful nap Once a week on Sunday afternoon. times a week and naps are usually Between 1 to 2 hours. in duration  He feels better after a nap: Yes  He dozes off unintentionally Never. days per week  Patient has had a driving accident or near-miss due to sleepiness/drowsiness: No      SLEEP DISRUPTIONS:    Breathing/Snoring  Patient snores:Yes  Other people complain about his snoring: Yes  Patient has been told he stops breathing in his sleep:Yes  He has issues with the following: Morning headaches;Getting up to urinate more than once    Movement:  Patient gets pain, discomfort, with an urge to move:  Yes  It happens when he is resting:  Yes  It happens more at night:  Yes  Patient has been told he kicks his legs at night:  No     Behaviors in Sleep:  Lionel Espitia Jr. has  experienced the following behaviors while sleeping: See or hear things that are not really there upon awakening or just falling asleep  He has experienced sudden muscle weakness during the day: No      Is there anything else you would like your sleep provider to know:        CAFFEINE AND OTHER SUBSTANCES:    Patient consumes caffeinated beverages per day:  1 per day.  Last caffeine use is usually: 1pm is my cut off.  List of any prescribed or over the counter stimulants that patient takes:    List of any prescribed or over the counter sleep medication patient takes: Unisom  List of previous sleep medications that patient has tried: Trazodone  Patient drinks alcohol to help them sleep: No  Patient drinks alcohol near bedtime: No    Family History:  Patient has a family member been diagnosed with a sleep disorder: No            SCALES:    EPWORTH SLEEPINESS SCALE         10/17/2023     9:34 AM    Abbott Sleepiness Scale ( SHAYLA [place]  [id number]<br>ESS - [place]/English - Final version - 21 Nov 07 - [facility] Research Joshua.)   Sitting and reading Would never doze   Watching TV Slight chance of dozing   Sitting, inactive in a public place (e.g. a theatre or a meeting) Would never doze   As a passenger in a car for an hour without a break Moderate chance of dozing   Lying down to rest in the afternoon when circumstances permit High chance of dozing   Sitting and talking to someone Would never doze   Sitting quietly after a lunch without alcohol Moderate chance of dozing   In a car, while stopped for a few minutes in traffic Would never doze   Abbott Score (MC) 8   Abbott Score (Sleep) 8         INSOMNIA SEVERITY INDEX (CHARLENE)          10/17/2023     8:36 AM   Insomnia Severity Index (CHARLENE)   Difficulty falling asleep 4   Difficulty staying asleep 3   Problems waking up too early 3   How SATISFIED/DISSATISFIED are you with your CURRENT sleep pattern? 4   How NOTICEABLE to others do you think your sleep problem is in terms of  impairing the quality of your life? 4   How WORRIED/DISTRESSED are you about your current sleep problem? 3   To what extent do you consider your sleep problem to INTERFERE with your daily functioning (e.g. daytime fatigue, mood, ability to function at work/daily chores, concentration, memory, mood, etc.) CURRENTLY? 4   CHARLENE Total Score 25       Guidelines for Scoring/Interpretation:  Total score categories:  0-7 = No clinically significant insomnia   8-14 = Subthreshold insomnia   15-21 = Clinical insomnia (moderate severity)  22-28 = Clinical insomnia (severe)  Used via courtesy of www.[employer].va.gov with permission from Justin Connelly PhD., [facility]      STOP BANG score: 7        10/17/2023     1:01 PM   STOP BANG Questionnaire (  2008, the American Society of Anesthesiologists, Inc. [employer]   BMI Clinic: 44.94         GAD7        4/13/2023     1:07 PM   BRITTNEY-7    1. Feeling nervous, anxious, or on edge 3   2. Not being able to stop or control worrying 2   3. Worrying too much about different things 2   4. Trouble relaxing 2   5. Being so restless that it is hard to sit still 2   6. Becoming easily annoyed or irritable 3   7. Feeling afraid, as if something awful might happen 1   BRITTNEY-7 Total Score 15   If you checked any problems, how difficult have they made it for you to do your work, take care of things at home, or get along with other people? Somewhat difficult         CAGE-AID        4/13/2023     1:07 PM   CAGE-AID Flowsheet   Have you ever felt you should Cut down on your drinking or drug use? 0   Have people Annoyed you by criticizing your drinking or drug use? 0   Have you ever felt bad or Guilty about your drinking or drug use? 0   Have you ever had a drink or used drugs first thing in the morning to steady your nerves or to get rid of a hangover? (Eye opener) 0   CAGE-AID SCORE 0       CAGE-AID reprinted with permission from the [place] Medical Journal, [place]  "JEAN and SRAVAN Mendoza, \"Conjoint screening questionnaires for alcohol and drug abuse\" [place] Medical Journal 94: 135-140, 1995.      PATIENT HEALTH QUESTIONNAIRE-9 (PHQ - 9)        4/13/2023     1:07 PM   PHQ-9 ([employer])   1.  Little interest or pleasure in doing things 3   2.  Feeling down, depressed, or hopeless 2   3.  Trouble falling or staying asleep, or sleeping too much 3   4.  Feeling tired or having little energy 3   5.  Poor appetite or overeating 1   6.  Feeling bad about yourself - or that you are a failure or have let yourself or your family down 2   7.  Trouble concentrating on things, such as reading the newspaper or watching television 3   8.  Moving or speaking so slowly that other people could have noticed. Or the opposite - being so fidgety or restless that you have been moving around a lot more than usual 0   9.  Thoughts that you would be better off dead, or of hurting yourself in some way 0   PHQ-9 Total Score 17   If you checked off any problems, how difficult have these problems made it for you to do your work, take care of things at home, or get along with other people? Somewhat difficult   6.  Feeling bad about yourself 2   7.  Trouble concentrating 3   8.  Moving slowly or restless 0   9.  Suicidal or self-harm thoughts 0   Difficulty at work, home, or with people Somewhat difficult       Developed by Aileen Bernard, Cheryl Gleason, Mj Hall and colleagues, with an educational camilla from [employer] No permission required to reproduce, translate, display or distribute.        Allergies:    Allergies   Allergen Reactions    Lisinopril Other (See Comments)     Coughing        Medications:    Current Outpatient Medications   Medication Sig Dispense Refill    albuterol (PROAIR HFA/PROVENTIL HFA/VENTOLIN HFA) 108 (90 Base) MCG/ACT inhaler Inhale 2 puffs into the lungs every 6 hours as needed for shortness of breath / dyspnea or wheezing 18 g 1    amLODIPine (NORVASC) 10 MG " tablet Take 1 tablet (10 mg) by mouth daily 90 tablet 1    hydrochlorothiazide (HYDRODIURIL) 25 MG tablet Take 1 tablet (25 mg) by mouth daily Unknown dosage 90 tablet 3    MELATONIN GUMMIES PO       metFORMIN (GLUCOPHAGE XR) 500 MG 24 hr tablet       vitamin D3 (CHOLECALCIFEROL) 50 mcg (2000 units) tablet Take 1 tablet (50 mcg) by mouth daily 90 tablet 01       Problem List:  Patient Active Problem List    Diagnosis Date Noted    Moderate asthma with acute exacerbation 12/29/2017     Priority: Medium    Primary insomnia 12/06/2017     Priority: Medium    Lesion of basal ganglia 12/06/2017     Priority: Medium     Formatting of this note might be different from the original.  Surgery in 1994        History of brain shunt 12/06/2017     Priority: Medium    Adjustment reaction 03/18/2015     Priority: Medium    Morbid obesity (H) 03/17/2010     Priority: Medium    Essential hypertension 01/06/2010     Priority: Medium    Impaired fasting blood sugar 01/06/2010     Priority: Medium    Brain tumor (benign) (H) 05/14/1993     Priority: Medium     Surgery in 1994  LW Modifier:  Right basal ganglia lesion s/p  shunt  ; Tumor Brain  NOS  Brain tumor biopsy in the past and  shunt  Formatting of this note might be different from the original. Surgery in 1994 LW Modifier:  Right basal ganglia lesion s/p  shunt ; Tumor Brain  NOS Brain tumor biopsy in the past and  shunt Formatting of this note might be different from the original. Brain tumor biopsy in the past and  shunt          Past Medical/Surgical History:  Past Medical History:   Diagnosis Date    Hypertension     Uncomplicated asthma      Past Surgical History:   Procedure Laterality Date    BIOPSY  1993    IMPLANT SHUNT VENTRICULOPERITONEAL  05/14/1993    VASECTOMY         Social History:  Social History     Socioeconomic History    Marital status:      Spouse name: Not on file    Number of children: Not on file    Years of education: Not on file     "Highest education level: Not on file   Occupational History    Not on file   Tobacco Use    Smoking status: Never    Smokeless tobacco: Never   Substance and Sexual Activity    Alcohol use: Not Currently    Drug use: Never    Sexual activity: Yes     Partners: Female     Birth control/protection: Male Surgical   Other Topics Concern    Parent/sibling w/ CABG, MI or angioplasty before 65F 55M? No   Social History Narrative    Not on file     Social Determinants of Health     Financial Resource Strain: Not on file   Food Insecurity: Not on file   Transportation Needs: Not on file   Physical Activity: Not on file   Stress: Not on file   Social Connections: Not on file   Interpersonal Safety: Not on file   Housing Stability: Not on file       Family History:  Family History   Problem Relation Age of Onset    Diabetes Type 2  Maternal Grandmother     Diabetes Maternal Grandmother     Hypertension Maternal Grandmother     Depression Maternal Grandmother     Obesity Mother        Review of Systems:  A complete review of systems reviewed by me is negative with the exeption of what has been mentioned in the history of present illness.  In the last TWO WEEKS have you experienced any of the following symptoms?  Fevers: No  Night Sweats: No  Weight Gain: No  Pain at Night: No  Double Vision: Yes  Changes in Vision: Yes  Difficulty Breathing through Nose: No  Sore Throat in Morning: No  Dry Mouth in the Morning: Yes  Shortness of Breath Lying Flat: No  Shortness of Breath With Activity: Yes  Awakening with Shortness of Breath: No  Increased Cough: No  Heart Racing at Night: No  Swelling in Feet or Legs: No  Diarrhea at Night: No  Heartburn at Night: No  Urinating More than Once at Night: Yes  Losing Control of Urine at Night: No  Joint Pains at Night: No  Headaches in Morning: Yes  Weakness in Arms or Legs: No  Depressed Mood: Yes  Anxiety: Yes     Physical Examination:  Vitals: Ht 1.88 m (6' 2\")   Wt (!) 158.8 kg (350 lb)   " "BMI 44.94 kg/m    BMI= Body mass index is 44.94 kg/m .           GENERAL APPEARANCE: healthy, alert, no distress, and cooperative  EYES: Eyes grossly normal to inspection and wearing eyeglasses  RESP: Unlabored, easy breathing with normal conversational speech  CV: color normal  NEURO: Alert and oriented x3, normal strength and tone, mentation intact, and speech normal  PSYCH: mentation appears normal and affect normal/bright  Mallampati Class: Unable to examine  Tonsillar Stage: Unable to examine.         Data: All pertinent previous laboratory data reviewed     Recent Labs   Lab Test 02/13/23  1104 10/14/22  0839    142   POTASSIUM 4.0 4.0   CHLORIDE 104 104   CO2 24 29   ANIONGAP 12 9   GLC 92 105*   BUN 12.2 11.6   CR 1.03 1.00   LAUREN 9.7 9.6       Recent Labs   Lab Test 10/14/22  0839   WBC 4.4   RBC 5.20   HGB 13.7   HCT 43.2   MCV 83   MCH 26.3*   MCHC 31.7   RDW 13.0          Recent Labs   Lab Test 10/14/22  0839   PROTTOTAL 7.2   ALBUMIN 4.3   BILITOTAL 0.3   ALKPHOS 83   AST 20   ALT 23       TSH (uIU/mL)   Date Value   10/14/2022 2.15   03/10/2021 1.27       No results found for: \"UAMP\", \"UBARB\", \"BENZODIAZEUR\", \"UCANN\", \"UCOC\", \"OPIT\", \"UPCP\"    Ferritin   Date/Time Value Ref Range Status   01/22/2021 11:30  26 - 388 ng/mL Final       No results found for: \"PH\", \"PHARTERIAL\", \"PO2\", \"PN2GIXSYREU\", \"SAT\", \"PCO2\", \"HCO3\", \"BASEEXCESS\", \"CINDY\", \"BEB\"    @LABRCNTIPR(phv:4,pco2v:4,po2v:4,hco3v:4,misti:4,o2per:4)@    Echocardiology: No results found for this or any previous visit (from the past 4320 hour(s)).    Chest x-ray: No results found for this or any previous visit from the past 365 days.      Chest CT: No results found for this or any previous visit from the past 365 days.      PFT: Most Recent Breeze Pulmonary Function Testing    No results found for: \"20001\"  No results found for: \"20002\"  No results found for: \"20003\"  No results found for: \"20015\"  No results found for: \"20016\"  No " "results found for: \"20027\"  No results found for: \"20028\"  No results found for: \"20029\"  No results found for: \"20079\"  No results found for: \"20080\"  No results found for: \"20081\"  No results found for: \"20335\"  No results found for: \"20105\"  No results found for: \"20053\"  No results found for: \"20054\"  No results found for: \"20055\"      DAYO Carroll CNP 10/17/2023   Sleep Medicine      This note was written with the assistance of the Dragon voice-dictation technology software. The final document, although reviewed, may contain errors. For corrections, please contact the office.          "

## 2023-10-17 NOTE — NURSING NOTE
Is the patient currently in the state of [place]? YES    Visit mode:VIDEO    If the visit is dropped, the patient can be reconnected by: VIDEO VISIT: Send to e-mail at: [email or web address]    Will anyone else be joining the visit? NO  (If patient encounters technical issues they should call [phone]    How would you like to obtain your AVS? MyChart    Are changes needed to the allergy or medication list? Pt stated no changes to allergies and Pt stated no med changes    Has patient had flu shot for current/most recent flu season? If so, when? No     Reason for visit: Consult    Heydi HAYES

## 2023-10-17 NOTE — PATIENT INSTRUCTIONS
"          MY TREATMENT INFORMATION FOR SLEEP APNEA-  Lionel Espitia Jr.    DOCTOR : DAYO Carroll CNP    Am I having a sleep study at a sleep center?  --->Due to normal delays, you will be contacted within 2-4 weeks to schedule    Am I having a home sleep study?  --->Watch the video for the device you are using:    -/drop off device-   https://www.Comply7ube.com/watch?v=yGGFBdELGhk    -Disposable device sent out require phone/computer application-   https://www.Tame.com/watch?v=BCce_vbiwxE      Frequently asked questions:  1. What is Obstructive Sleep Apnea (MANAN)? MANAN is the most common type of sleep apnea. Apnea means, \"without breath.\"  Apnea is most often caused by narrowing or collapse of the upper airway as muscles relax during sleep.   Almost everyone has occasional apneas. Most people with sleep apnea have had brief interruptions at night frequently for many years.  The severity of sleep apnea is related to how frequent and severe the events are.   2. What are the consequences of MANAN? Symptoms include: feeling sleepy during the day, snoring loudly, gasping or stopping of breathing, trouble sleeping, and occasionally morning headaches or heartburn at night.  Sleepiness can be serious and even increase the risk of falling asleep while driving. Other health consequences may include development of high blood pressure and other cardiovascular disease in persons who are susceptible. Untreated MANAN  can contribute to heart disease, stroke and diabetes.   3. What are the treatment options? In most situations, sleep apnea is a lifelong disease that must be managed with daily therapy. Medications are not effective for sleep apnea and surgery is generally not considered until other therapies have been tried. Your treatment is your choice . Continuous Positive Airway (CPAP) works right away and is the therapy that is effective in nearly everyone. An oral device to hold your jaw forward is usually the next most " reliable option. Other options include postioning devices (to keep you off your back), weight loss, and surgery including a tongue pacing device. There is more detail about some of these options below.  4. Are my sleep studies covered by insurance? Although we will request verification of coverage, we advise you also check in advance of the study to ensure there is coverage.    Important tips for those choosing CPAP and similar devices  For new devices, sign up for device NIKOS to monitor your device for your followup visits  We encourage you to utilize the [employer] nikos or website (myAir web (resmed.com) ) to monitor your therapy progress and share the data with your healthcare team when you discuss your sleep apnea.                                                    Know your equipment:  CPAP is continuous positive airway pressure that prevents obstructive sleep apnea by keeping the throat from collapsing while you are sleeping. In most cases, the device is  smart  and can slowly self-adjusts if your throat collapses and keeps a record every day of how well you are treated-this information is available to you and your care team.  BPAP is bilevel positive airway pressure that keeps your throat open and also assists each breath with a pressure boost to maintain adequate breathing.  Special kinds of BPAP are used in patients who have inadequate breathing from lung or heart disease. In most cases, the device is  smart  and can slowly self-adjusts to assist breathing. Like CPAP, the device keeps a record of how well you are treated.  Your mask is your connection to the device. You get to choose what feels most comfortable and the staff will help to make sure if fits. Here: are some examples of the different masks that are available:       Key points to remember on your journey with sleep apnea:  Sleep study.  PAP devices often need to be adjusted during a sleep study to show that they are effective and adjusted  right.  Good tips to remember: Try wearing just the mask during a quiet time during the day so your body adapts to wearing it. A humidifier is recommended for comfort in most cases to prevent drying of your nose and throat. Allergy medication from your provider may help you if you are having nasal congestion.  Getting settled-in. It takes more than one night for most of us to get used to wearing a mask. Try wearing just the mask during a quiet time during the day so your body adapts to wearing it. A humidifier is recommended for comfort in most cases. Our team will work with you carefully on the first day and will be in contact within 4 days and again at 2 and 4 weeks for advice and remote device adjustments. Your therapy is evaluated by the device each day.   Use it every night. The more you are able to sleep naturally for 7-8 hours, the more likely you will have good sleep and to prevent health risks or symptoms from sleep apnea. Even if you use it 4 hours it helps. Occasionally all of us are unable to use a medical therapy, in sleep apnea, it is not dangerous to miss one night.   Communicate. Call our skilled team on the number provided on the first day if your visit for problems that make it difficult to wear the device. Over 2 out of 3 patients can learn to wear the device long-term with help from our team. Remember to call our team or your sleep providers if you are unable to wear the device as we may have other solutions for those who cannot adapt to mask CPAP therapy. It is recommended that you sleep your sleep provider within the first 3 months and yearly after that if you are not having problems.   Use it for your health. We encourage use of CPAP masks during daytime quiet periods to allow your face and brain to adapt to the sensation of CPAP so that it will be a more natural sensation to awaken to at night or during naps. This can be very useful during the first few weeks or months of adapting to CPAP  though it does not help medically to wear CPAP during wakefulness and  should not be used as a strategy just to meet guidelines.  Take care of your equipment. Make sure you clean your mask and tubing using directions every day and that your filter and mask are replaced as recommended or if they are not working.     BESIDES CPAP, WHAT OTHER THERAPIES ARE THERE?    Positioning Device  Positioning devices are generally used when sleep apnea is mild and only occurs on your back.This example shows a pillow that straps around the waist. It may be appropriate for those whose sleep study shows milder sleep apnea that occurs primarily when lying flat on one's back. Preliminary studies have shown benefit but effectiveness at home may need to be verified by a home sleep test. These devices are generally not covered by medical insurance.  Examples of devices that maintain sleeping on the back to prevent snoring and mild sleep apnea.    Belt type body positioner  http://31Dover/    Electronic reminder  http://nightshifttherapy.Moka/            Oral Appliance  What is oral appliance therapy?  An oral appliance device fits on your teeth at night like a retainer used after having braces. The device is made by a specialized dentist and requires several visits over 1-2 months before a manufactured device is made to fit your teeth and is adjusted to prevent your sleep apnea. Once an oral device is working properly, snoring should be improved. A home sleep test may be recommended at that time if to determine whether the sleep apnea is adequately treated.       Some things to remember:  -Oral devices are often, but not always, covered by your medical insurance. Be sure to check with your insurance provider.   -If you are referred for oral therapy, you will be given a list of specialized dentists to consider or you may choose to visit the Web site of the American Academy of Dental Sleep Medicine  -Oral devices are less likely to work  if you have severe sleep apnea or are extremely overweight.     More detailed information  An oral appliance is a small acrylic device that fits over the upper and lower teeth  (similar to a retainer or a mouth guard). This device slightly moves jaw forward, which moves the base of the tongue forward, opens the airway, improves breathing for effective treat snoring and obstructive sleep apnea in perhaps 7 out of 10 people .  The best working devices are custom-made by a dental device  after a mold is made of the teeth 1, 2, 3.  When is an oral appliance indicated?  Oral appliance therapy is recommended as a first-line treatment for patients with primary snoring, mild sleep apnea, and for patients with moderate sleep apnea who prefer appliance therapy to use of CPAP4, 5. Severity of sleep apnea is determined by sleep testing and is based on the number of respiratory events per hour of sleep.   How successful is oral appliance therapy?  The success rate of oral appliance therapy in patients with mild sleep apnea is 75-80% while in patients with moderate sleep apnea it is 50-70%. The chance of success in patients with severe sleep apnea is 40-50%. The research also shows that oral appliances have a beneficial effect on the cardiovascular health of MANAN patients at the same magnitude as CPAP therapy7.  Oral appliances should be a second-line treatment in cases of severe sleep apnea, but if not completely successful then a combination therapy utilizing CPAP plus oral appliance therapy may be effective. Oral appliances tend to be effective in a broad range of patients although studies show that the patients who have the highest success are females, younger patients, those with milder disease, and less severe obesity. 3, 6.   Finding a dentist that practices dental sleep medicine  Specific training is available through the American Academy of Dental Sleep Medicine for dentists interested in working in the field  of sleep. To find a dentist who is educated in the field of sleep and the use of oral appliances, near you, visit the Web site of the American Academy of Dental Sleep Medicine.    References  1. Tana et al. Objectively measured vs self-reported compliance during oral appliance therapy for sleep-disordered breathing. Chest 2013; 144(5): 4164-8589.  2. Elsa, et al. Objective measurement of compliance during oral appliance therapy for sleep-disordered breathing. Thorax 2013; 68(1): 91-96.  3. Elisabeth et al. Mandibular advancement devices in 620 men and women with MANAN and snoring: tolerability and predictors of treatment success. Chest 2004; 125: 5928-4795.  4. Dana et al. Oral appliances for snoring and MANAN: a review. Sleep 2006; 29: 244-262.  5. Jhon et al. Oral appliance treatment for MANAN: an update. J Clin Sleep Med 2014; 10(2): 215-227.  6. Leoncio et al. Predictors of OSAH treatment outcome. J Dent Res 2007; 86: 1183-5966.      Weight Loss:    Weight loss is a long-term strategy that may improve sleep apnea in some patients.    Weight management is a personal decision and the decision should be based on your interest and the potential benefits.  If you are interested in exploring weight loss strategies, the following discussion covers the impact on weight loss on sleep apnea and the approaches that may be successful.    Being overweight does not necessarily mean you will have health consequences.  Those who have BMI over 35 or over 27 with existing medical conditions carries greater risk.   Weight loss decreases severity of sleep apnea in most people with obesity. For those with mild obesity who have developed snoring with weight gain, even 15-30 pound weight loss can improve and occasionally eliminate sleep apnea.  Structured and life-long dietary and health habits are necessary to lose weight and keep healthier weight levels.     Though there may be significant health benefits from  weight loss, long-term weight loss is very difficult to achieve- studies show success with dietary management in less than 10% of people. In addition, substantial weight loss may require years of dietary control and may be difficult if patients have severe obesity. In these cases, surgical management may be considered.  Finally, older individuals who have tolerated obesity without health complications may be less likely to benefit from weight loss strategies.      Your BMI is Body mass index is 44.94 kg/m .    What is BMI?  Body mass index (BMI) is one way to tell whether you are at a healthy weight, overweight, or obese. It measures your weight in relation to your height.  A BMI of 18.5 to 24.9 is in the healthy range. A person with a BMI of 25 to 29.9 is considered overweight, and someone with a BMI of 30 or greater is considered obese.  Another way to find out if you are at risk for health problems caused by overweight and obesity is to measure your waist. If you are a woman and your waist is more than 35 inches, or if you are a man and your waist is more than 40 inches, your risk of disease may be higher.  More than two-thirds of American adults are considered overweight or obese. Being overweight or obese increases the risk for further weight gain.  Excess weight may lead to heart disease and diabetes. Creating and following plans for healthy eating and physical activity may help you improve your health.    Methods for maintaining or losing weight.  Weight control is part of healthy lifestyle and includes exercise, emotional health, and healthy eating habits.  Careful eating habits lifelong is the mainstay of weight control.  Though there are significant health benefits from weight loss, long-term weight loss with diet alone may be very difficult to achieve- studies show long-term success with dietary management in less than 10% of people. Attaining a healthy weight may be especially difficult to achieve in  those with severe obesity. In some cases, medications, devices and surgical management might be considered.    What can you do?  If you are overweight or obese and are interested in methods for weight loss, you should discuss this with your provider. In addition, we recommend that you review healthy life styles and methods for weight loss available through the National Institutes of Health patient information sites:   http://win.niddk.nih.gov/publications/index.htm    Surgery:    Surgery for obstructive sleep apnea is considered generally only when other therapies fail to work. Surgery may be discussed with you if you are having a difficult time tolerating CPAP and or when there is an abnormal structure that requires surgical correction.  Nose and throat surgeries often enlarge the airway to prevent collapse.  Most of these surgeries create pain for 1-2 weeks and up to half of the most common surgeries are not effective throughout life.  You should carefully discuss the benefits and drawbacks to surgery with your sleep provider and surgeon to determine if it is the best solution for you.   More information  Surgery for MANAN is directed at areas that are responsible for narrowing or complete obstruction of the airway during sleep.  There are a wide range of procedures available to enlarge and/or stabilize the airway to prevent blockage of breathing in the three major areas where it can occur: the palate, tongue, and nasal regions.  Successful surgical treatment depends on the accurate identification of the factors responsible for obstructive sleep apnea in each person.  A personalized approach is required because there is no single treatment that works well for everyone.  Because of anatomic variation, consultation with an examination by a sleep surgeon is a critical first step in determining what surgical options are best for each patient.  In some cases, examination during sedation may be recommended in order to  guide the selection of procedures.  Patients will be counseled about risks and benefits as well as the typical recovery course after surgery. Surgery is typically not a cure for a person s MANAN.  However, surgery will often significantly improve one s MANAN severity (termed  success rate ).  Even in the absence of a cure, surgery will decrease the cardiovascular risk associated with OSA7; improve overall quality of life8 (sleepiness, functionality, sleep quality, etc).      Palate Procedures:  Patients with MANAN often have narrowing of their airway in the region of their tonsils and uvula.  The goals of palate procedures are to widen the airway in this region as well as to help the tissues resist collapse.  Modern palate procedure techniques focus on tissue conservation and soft tissue rearrangement, rather than tissue removal.  Often the uvula is preserved in this procedure. Residual sleep apnea is common in patient after pharyngoplasty with an average reduction in sleep apnea events of 33%2.      Tongue Procedures:  ExamWhile patients are awake, the muscles that surround the throat are active and keep this region open for breathing. These muscles relax during sleep, allowing the tongue and other structures to collapse and block breathing.  There are several different tongue procedures available.  Selection of a tongue base procedure depends on characteristics seen on physical exam.  Generally, procedures are aimed at removing bulky tissues in this area or preventing the back of the tongue from falling back during sleep.  Success rates for tongue surgery range from 50-62%3.    Hypoglossal Nerve Stimulation:  Hypoglossal nerve stimulation has recently received approval from the [place] Food and Drug Administration for the treatment of obstructive sleep apnea.  This is based on research showing that the system was safe and effective in treating sleep apnea6.  Results showed that the median AHI score decreased 68%,  from 29.3 to 9.0. This therapy uses an implant system that senses breathing patterns and delivers mild stimulation to airway muscles, which keeps the airway open during sleep.  The system consists of three fully implanted components: a small generator (similar in size to a pacemaker), a breathing sensor, and a stimulation lead.  Using a small handheld remote, a patient turns the therapy on before bed and off upon awakening.    Candidates for this device must be greater than 18 years of age, have moderate to severe MANAN (AHI between 15-65), BMI less than 35, have tried CPAP/oral appliance for at least 8 weeks without success, and have appropriate upper airway anatomy (determined by a sleep endoscopy performed by Dr. Ross Lyle).    Hypoglossal Nerve Stimulation Pathway:    The sleep surgeon s office will work with the patient through the insurance prior-authorization process (including communications and appeals).    Nasal Procedures:  Nasal obstruction can interfere with nasal breathing during the day and night.  Studies have shown that relief of nasal obstruction can improve the ability of some patients to tolerate positive airway pressure therapy for obstructive sleep apnea1.  Treatment options include medications such as nasal saline, topical corticosteroid and antihistamine sprays, and oral medications such as antihistamines or decongestants. Non-surgical treatments can include external nasal dilators for selected patients. If these are not successful by themselves, surgery can improve the nasal airway either alone or in combination with these other options.      Combination Procedures:  Combination of surgical procedures and other treatments may be recommended, particularly if patients have more than one area of narrowing or persistent positional disease.  The success rate of combination surgery ranges from 66-80%2,3.    References  Jim PENA. The Role of the Nose in Snoring and Obstructive Sleep Apnoea: An  Update.  Eur Arch Otorhinolaryngol. 2011; 268: 1365-73.   Abdulkadir SM; Vishnu JA; Rosanne JR; Pallanch JF; Tigist MB; Maia SG; Tim GUZMÁN. Surgical modifications of the upper airway for obstructive sleep apnea in adults: a systematic review and meta-analysis. SLEEP 2010;33(10):4421-6775. Pablito TRIPP. Hypopharyngeal surgery in obstructive sleep apnea: an evidence-based medicine review.  Arch Otolaryngol Head Neck Surg. 2006 Feb;132(2):206-13.  Danny YH1, Capo Y, Eduardo ROSANNE. The efficacy of anatomically based multilevel surgery for obstructive sleep apnea. Otolaryngol Head Neck Surg. 2003 Oct;129(4):327-35.  Kezirian E, Goldberg A. Hypopharyngeal Surgery in Obstructive Sleep Apnea: An Evidence-Based Medicine Review. Arch Otolaryngol Head Neck Surg. 2006 Feb;132(2):206-13.  Javan BERNARD et al. Upper-Airway Stimulation for Obstructive Sleep Apnea.  N Engl J Med. 2014 Jan 9;370(2):139-49.  Peorlin Y et al. Increased Incidence of Cardiovascular Disease in Middle-aged Men with Obstructive Sleep Apnea. Am J Respir Crit Care Med; 2002 166: 159-165  Hanson EM et al. Studying Life Effects and Effectiveness of Palatopharyngoplasty (SLEEP) study: Subjective Outcomes of Isolated Uvulopalatopharyngoplasty. Otolaryngol Head Neck Surg. 2011; 144: 623-631.        WHAT IF I ONLY HAVE SNORING?    Mandibular advancement devices, lateral sleep positioning, long-term weight loss and treatment of nasal allergies have been shown to improve snoring.  Exercising tongue muscles with a game (https://apps.[employer] developers.Micronotes/us/nikos/soundly-reduce-snoring/oa1767733214) or stimulating the tongue during the day with a device (https://doi.org/10.3390/wnb25883031) have improved snoring in some individuals.    Remember to Drive Safe... Drive Alive     Sleep health profoundly affects your health, mood, and your safety.  Thirty three percent of the population (one in three of us) is not getting enough sleep and many have a sleep disorder. Not getting enough sleep or  having an untreated / undertreated sleep condition may make us sleepy without even knowing it. In fact, our driving could be dramatically impaired due to our sleep health. As your provider, here are some things I would like you to know about driving:     Here are some warning signs for impairment and dangerous drowsy driving:              -Having been awake more than 16 hours               -Looking tired               -Eyelid drooping              -Head nodding (it could be too late at this point)              -Driving for more than 30 minutes     Some things you could do to make the driving safer if you are experiencing some drowsiness:              -Stop driving and rest              -Call for transportation              -Make sure your sleep disorder is adequately treated     Some things that have been shown NOT to work when experiencing drowsiness while driving:              -Turning on the radio              -Opening windows              -Eating any  distracting  /  entertaining  foods (e.g., sunflower seeds, candy, or any other)              -Talking on the phone      Your decision may not only impact your life, but also the life of others. Please, remember to drive safe for yourself and all of us.

## 2023-12-14 ENCOUNTER — OFFICE VISIT (OUTPATIENT)
Dept: FAMILY MEDICINE | Facility: CLINIC | Age: 43
End: 2023-12-14
Payer: COMMERCIAL

## 2023-12-14 VITALS
HEART RATE: 108 BPM | WEIGHT: 315 LBS | RESPIRATION RATE: 18 BRPM | HEIGHT: 74 IN | SYSTOLIC BLOOD PRESSURE: 144 MMHG | DIASTOLIC BLOOD PRESSURE: 94 MMHG | TEMPERATURE: 97.6 F | BODY MASS INDEX: 40.43 KG/M2

## 2023-12-14 DIAGNOSIS — F98.8 ATTENTION DEFICIT DISORDER, UNSPECIFIED HYPERACTIVITY PRESENCE: ICD-10-CM

## 2023-12-14 DIAGNOSIS — M79.672 LEFT FOOT PAIN: ICD-10-CM

## 2023-12-14 DIAGNOSIS — M21.42 ACQUIRED PES PLANUS OF BOTH FEET: ICD-10-CM

## 2023-12-14 DIAGNOSIS — M21.41 ACQUIRED PES PLANUS OF BOTH FEET: ICD-10-CM

## 2023-12-14 DIAGNOSIS — I10 ESSENTIAL HYPERTENSION: Primary | ICD-10-CM

## 2023-12-14 PROCEDURE — 99214 OFFICE O/P EST MOD 30 MIN: CPT | Performed by: FAMILY MEDICINE

## 2023-12-14 RX ORDER — LOSARTAN POTASSIUM 50 MG/1
50 TABLET ORAL DAILY
Qty: 90 TABLET | Refills: 1 | Status: SHIPPED | OUTPATIENT
Start: 2023-12-14 | End: 2024-01-16

## 2023-12-14 RX ORDER — LISDEXAMFETAMINE DIMESYLATE 10 MG/1
10 CAPSULE ORAL EVERY MORNING
Qty: 60 CAPSULE | Refills: 0 | Status: SHIPPED | OUTPATIENT
Start: 2023-12-14 | End: 2024-02-29

## 2023-12-14 ASSESSMENT — ASTHMA QUESTIONNAIRES: ACT_TOTALSCORE: 13

## 2023-12-14 NOTE — PROGRESS NOTES
DME (Durable Medical Equipment) Orders and Documentation  Orders Placed This Encounter   Procedures     Home Blood Pressure Monitor Order        The patient was assessed and it was determined the patient is in need of the following listed DME Supplies/Equipment. Please complete supporting documentation below to demonstrate medical necessity.

## 2023-12-14 NOTE — PROGRESS NOTES
Assessment & Plan     Essential hypertension  Discussed need for better control.  Main targeted to reduce stroke and heart attack risk.  Patient getting sleep apnea evaluation.  It takes forever to get them, unfortunately.  Recommend adding losartan to current amlodipine and hydrochlorothiazide.  Particularly with addition of stimulant discussed below.  Will need recheck.  Labs when he rechecks.  - Home Blood Pressure Monitor Order  - losartan (COZAAR) 50 MG tablet  Dispense: 90 tablet; Refill: 1    Attention deficit disorder, unspecified hyperactivity presence  Needs enough criteria for attention deficit.  Some question as to whether or not previous brain surgery would invalidate that diagnosis.  Discussed with him that phenotype matters, regardless of underlying brain structure and that we could trial a stimulant to see if this significantly alleviates his symptoms.  MANAN and poor sleep could contribute so I am glad he is getting that taken care of.  We discussed potential side effects of stimulants including appetite suppression which may be beneficial for him and weight loss efforts, difficulty with sleep, potential for palpitations.  Potential for blood pressure increase.  Potential mood changes.  As discussed above, add losartan to get blood pressure under better control along with the addition of lisdexamfetamine.  Discussed he could try 10 mg for about a week and 20 mg we thought more therapy was needed.  may need an early refill so contact me via [employer] if that is the case.  - lisdexamfetamine (VYVANSE) 10 MG capsule  Dispense: 60 capsule; Refill: 0    Acquired pes planus of both feet  Left foot pain  Discussed diagnoses and observations.  I do not think an x-ray is warranted.  At this point I recommend he pursue some kind of footwear with added arch support.  Could be new shoes or could get insoles.  Offered referral to podiatry for custom insoles/orthotics but he declined at this time.  Reasonable to  "try home remedies first and can escalate therapy if needed.      Future Appointments   Date Time Provider Department Center   1/16/2024 10:20 AM Tashi Sin MD I[facility]   2/26/2024 11:00 AM Tashi Sin MD [facility]ANG [facility]   2/27/2024  2:00 PM MPBE HST [facility]   2/28/2024  8:45 AM MPBE HST [facility]   3/14/2024  3:00 PM Neftali Houston, DAYO [facility]           BMI:   Estimated body mass index is 46.22 kg/m  as calculated from the following:    Height as of this encounter: 1.88 m (6' 2\").    Weight as of this encounter: 163.3 kg (360 lb).     Tashi Sin MD  [facility]    Debo Flowers is a 43 year old, presenting for the following health issues:  A.D.H.D        12/14/2023     3:02 PM   Additional Questions   Roomed by Tia       History of Present Illness       Reason for visit:  ADHD conversation.    He eats 2-3 servings of fruits and vegetables daily.He consumes 3 sweetened beverage(s) daily.He exercises with enough effort to increase his heart rate 10 to 19 minutes per day.  He exercises with enough effort to increase his heart rate 3 or less days per week.   He is taking medications regularly.           12/14/2023     2:37 PM   ACT Total Scores   ACT TOTAL SCORE (Goal Greater than or Equal to 20) 13   In the past 12 months, how many times did you visit the emergency room for your asthma without being admitted to the hospital? 0   In the past 12 months, how many times were you hospitalized overnight because of your asthma? 0       Patient here for discussion of ADHD symptoms.  He filled out a screening tool which indicates that very often he has symptoms of 1.  Difficulty wrapping up final details of a project 2, avoiding tasks that require a lot of thought.  Often he reported symptoms of 1 difficulty getting things in order when a task requires organization and 2.  Feeling overly active and compelled to do things like driven by a motor.  Reports that " "sometimes he has symptoms of 1.  Problems remembering appointments or obligations.  2 fidgeting or squirming with hands and feet if he has to sit for a long time.    From our discussion and review of Sharon deficits executive functioning scale, patient has difficulty maintaining detailed work, sustaining attention, becomes easily distracted, difficulty with follow-through, difficulty with organization, difficulty with avoiding difficult or thought intensive tasks.    Did have some difficulty in school.  Had a  and a para in grade school.  Reported discipline and behavior problems sometimes.  Had difficulty finishing and completing tasks often.  Special ed and had extra time allotted in high school to take tests.    He has an additional concern about foot pain.  Midfoot.  Not necessarily in any joint.  Mostly on the left.  Seems to be mostly in mid first metatarsal.      Objective    BP (!) 144/94   Pulse 108   Temp 97.6  F (36.4  C) (Temporal)   Resp 18   Ht 1.88 m (6' 2\")   Wt (!) 163.3 kg (360 lb)   BMI 46.22 kg/m    Body mass index is 46.22 kg/m .  Physical Exam   GENERAL: alert, not distressed  CHEST: clear, no rales, rhonchi, or wheezes  CARDIAC: regular without murmur, gallop, or rub    Feet: Normal CMS.  Significantly pes planus.  Some ankle pronation as well.  Foot wear inspected.  Almost no arch support.          Prior to immunization administration, verified patients identity using patient s name and date of birth. Please see Immunization Activity for additional information.     Screening Questionnaire for Adult Immunization    Are you sick today?   No   Do you have allergies to medications, food, a vaccine component or latex?   No   Have you ever had a serious reaction after receiving a vaccination?   No   Do you have a long-term health problem with heart, lung, kidney, or metabolic disease (e.g., diabetes), asthma, a blood disorder, no spleen, complement component deficiency, a cochlear " implant, or a spinal fluid leak?  Are you on long-term aspirin therapy?   Yes   Do you have cancer, leukemia, HIV/AIDS, or any other immune system problem?   No   Do you have a parent, brother, or sister with an immune system problem?   No   In the past 3 months, have you taken medications that affect  your immune system, such as prednisone, other steroids, or anticancer drugs; drugs for the treatment of rheumatoid arthritis, Crohn s disease, or psoriasis; or have you had radiation treatments?   No   Have you had a seizure, or a brain or other nervous system problem?   Yes   During the past year, have you received a transfusion of blood or blood    products, or been given immune (gamma) globulin or antiviral drug?   No   For women: Are you pregnant or is there a chance you could become       pregnant during the next month?   No   Have you received any vaccinations in the past 4 weeks?   No     Immunization questionnaire was positive for at least one answer.  Notified .      Patient instructed to remain in clinic for 15 minutes afterwards, and to report any adverse reactions.     Screening performed by Kathrine Faulkner CMA on 12/14/2023 at 3:05 PM.

## 2023-12-15 DIAGNOSIS — I10 ESSENTIAL HYPERTENSION: ICD-10-CM

## 2023-12-15 DIAGNOSIS — Z76.0 ENCOUNTER FOR MEDICATION REFILL: ICD-10-CM

## 2023-12-15 RX ORDER — HYDROCHLOROTHIAZIDE 25 MG/1
25 TABLET ORAL DAILY
Qty: 30 TABLET | Refills: 3 | Status: SHIPPED | OUTPATIENT
Start: 2023-12-15 | End: 2024-02-29 | Stop reason: ALTCHOICE

## 2023-12-15 RX ORDER — AMLODIPINE BESYLATE 10 MG/1
10 TABLET ORAL DAILY
Qty: 30 TABLET | Refills: 1 | Status: SHIPPED | OUTPATIENT
Start: 2023-12-15 | End: 2024-04-03

## 2023-12-15 NOTE — PATIENT INSTRUCTIONS
A new blood pressure medicine was prescribed today.  Losartan.  Start taking that and monitor your blood pressures.  Goal blood pressures is less than 140/90.  Ideally, you could check pressure once per day at least.  Sit quietly for 5 minutes before blood pressure recheck.  Report blood pressures to me in a [employer] message about once per week until we know you are at goal.    Prescription for blood pressure monitor sent to the [facility].  It can be obtained there.    You will also start Vyvanse or lisdexamfetamine for attention.  It may raise blood pressure so we may need to make adjustments to your medication.  After 1 week of 10 mg tablets 1 time per day.  You could increase to 2 tablets if you thought that was needed.  You may need an early refill so contact me via [employer] if that is the case.

## 2023-12-26 NOTE — NURSING NOTE
HST pick-up, HST drop-off, and a follow-up appointment with provider have all been scheduled.  Jody Rodriguez, CMA

## 2024-01-16 ENCOUNTER — OFFICE VISIT (OUTPATIENT)
Dept: FAMILY MEDICINE | Facility: CLINIC | Age: 44
End: 2024-01-16
Payer: COMMERCIAL

## 2024-01-16 VITALS
HEIGHT: 74 IN | OXYGEN SATURATION: 98 % | WEIGHT: 315 LBS | DIASTOLIC BLOOD PRESSURE: 94 MMHG | HEART RATE: 75 BPM | SYSTOLIC BLOOD PRESSURE: 139 MMHG | RESPIRATION RATE: 18 BRPM | TEMPERATURE: 99.5 F | BODY MASS INDEX: 40.43 KG/M2

## 2024-01-16 DIAGNOSIS — R41.840 ATTENTION AND CONCENTRATION DEFICIT: ICD-10-CM

## 2024-01-16 DIAGNOSIS — J45.40 MODERATE PERSISTENT ASTHMA WITHOUT COMPLICATION: Primary | ICD-10-CM

## 2024-01-16 DIAGNOSIS — I10 ESSENTIAL HYPERTENSION: ICD-10-CM

## 2024-01-16 PROBLEM — J45.909 MODERATE ASTHMA WITHOUT COMPLICATION: Status: ACTIVE | Noted: 2017-12-29

## 2024-01-16 PROCEDURE — 99214 OFFICE O/P EST MOD 30 MIN: CPT | Performed by: FAMILY MEDICINE

## 2024-01-16 RX ORDER — LOSARTAN POTASSIUM 50 MG/1
100 TABLET ORAL DAILY
Qty: 90 TABLET | Refills: 1 | Status: SHIPPED | OUTPATIENT
Start: 2024-01-16 | End: 2024-01-16

## 2024-01-16 RX ORDER — LOSARTAN POTASSIUM 100 MG/1
100 TABLET ORAL DAILY
Qty: 90 TABLET | Refills: 0 | Status: SHIPPED | OUTPATIENT
Start: 2024-01-16 | End: 2024-02-29 | Stop reason: ALTCHOICE

## 2024-01-16 ASSESSMENT — ASTHMA QUESTIONNAIRES: ACT_TOTALSCORE: 25

## 2024-01-16 NOTE — PROGRESS NOTES
"  Assessment & Plan     Essential hypertension  Controlled not at goal.  Recommended to increase losartan from 50 mg to 100 mg/day.  Recheck soon.  He has an appointment scheduled in follow-up.  - losartan (COZAAR) 100 MG tablet  Dispense: 90 tablet; Refill: 0    Moderate persistent asthma without complication  Much better ACT numbers.  Continue meds as currently prescribed which is only albuterol as needed.    Attention and concentration deficit  Recommend continuing Vyvanse.  Watch blood pressures as noted above.  Might try dosage up to 20 mg/day depending on response to therapy and blood pressure.    He declined all vaccines today.     Future Appointments   Date Time Provider Department Center   2/27/2024  2:00 PM MPBE HST [facility]   2/28/2024  8:45 AM MPBE HST [facility]   2/29/2024  8:40 AM [facility]   3/14/2024  3:00 PM Neftali Houston, APRN [facility]        BMI:   Estimated body mass index is 46.2 kg/m  as calculated from the following:    Height as of this encounter: 1.88 m (6' 2.02\").    Weight as of this encounter: 163.3 kg (360 lb).       Tashi Sin MD  [facility]    Debo Flowers is a 43 year old, presenting for the following health issues:  Follow Up (Medication follow-up )        1/16/2024    10:20 AM   Additional Questions   Roomed by Hussain       History of Present Illness       Reason for visit:  Follow-up for new meds.    He eats 2-3 servings of fruits and vegetables daily.He consumes 2 sweetened beverage(s) daily.He exercises with enough effort to increase his heart rate 10 to 19 minutes per day.  He exercises with enough effort to increase his heart rate 3 or less days per week.   He is taking medications regularly.         Hypertension Follow-up  Did not get home meter.  Tolerating losartan fine.  Sometimes feeling a little bit more tired.  Wonder if it is due to blood pressure medicine.    Asthma Follow-Up    Was ACT " "completed today?  Yes        1/16/2024    10:39 AM   ACT Total Scores   ACT TOTAL SCORE (Goal Greater than or Equal to 20) 25   In the past 12 months, how many times did you visit the emergency room for your asthma without being admitted to the hospital? 0   In the past 12 months, how many times were you hospitalized overnight because of your asthma? 0     Asthma much improved  No current issues.  Does not have controller medicine.    Started ADHD med at last visit.  About 1 month worth of trial.  Not sure its better.  That being said, kids at home during winter break for most of that time.  Working from home so it is hard to say if he was more distracted from the.  Wonders if work completion might be a little bit better.  Also wonders if that makes him a little bit more tired or have low energy.      Objective    BP (!) 139/94 (BP Location: Left arm, Patient Position: Sitting, Cuff Size: Thigh)   Pulse 75   Temp 99.5  F (37.5  C) (Temporal)   Resp 18   Ht 1.88 m (6' 2.02\")   Wt (!) 163.3 kg (360 lb)   SpO2 98%   BMI 46.20 kg/m    Body mass index is 46.2 kg/m .  Physical Exam   GENERAL: alert, not distressed  CHEST: clear, no rales, rhonchi, or wheezes  CARDIAC: regular without murmur, gallop, or rub                "

## 2024-02-27 ENCOUNTER — OFFICE VISIT (OUTPATIENT)
Dept: SLEEP MEDICINE | Facility: CLINIC | Age: 44
End: 2024-02-27
Attending: NURSE PRACTITIONER
Payer: COMMERCIAL

## 2024-02-27 VITALS — WEIGHT: 315 LBS | HEIGHT: 74 IN | BODY MASS INDEX: 40.43 KG/M2

## 2024-02-27 DIAGNOSIS — G47.9 SLEEP DISTURBANCE: ICD-10-CM

## 2024-02-27 DIAGNOSIS — G47.00 INSOMNIA, UNSPECIFIED TYPE: ICD-10-CM

## 2024-02-27 DIAGNOSIS — I10 ESSENTIAL HYPERTENSION: ICD-10-CM

## 2024-02-27 DIAGNOSIS — R40.0 DAYTIME SOMNOLENCE: ICD-10-CM

## 2024-02-27 DIAGNOSIS — R06.83 SNORING: ICD-10-CM

## 2024-02-27 DIAGNOSIS — E66.01 MORBID OBESITY (H): ICD-10-CM

## 2024-02-27 DIAGNOSIS — R06.81 WITNESSED APNEIC SPELLS: ICD-10-CM

## 2024-02-27 PROCEDURE — 95800 SLP STDY UNATTENDED: CPT | Performed by: INTERNAL MEDICINE

## 2024-02-27 ASSESSMENT — SLEEP AND FATIGUE QUESTIONNAIRES
HOW LIKELY ARE YOU TO NOD OFF OR FALL ASLEEP WHILE WATCHING TV: SLIGHT CHANCE OF DOZING
HOW LIKELY ARE YOU TO NOD OFF OR FALL ASLEEP WHILE SITTING AND READING: WOULD NEVER DOZE
HOW LIKELY ARE YOU TO NOD OFF OR FALL ASLEEP WHILE LYING DOWN TO REST IN THE AFTERNOON WHEN CIRCUMSTANCES PERMIT: MODERATE CHANCE OF DOZING
HOW LIKELY ARE YOU TO NOD OFF OR FALL ASLEEP WHILE SITTING AND TALKING TO SOMEONE: WOULD NEVER DOZE
HOW LIKELY ARE YOU TO NOD OFF OR FALL ASLEEP WHILE SITTING QUIETLY AFTER LUNCH WITHOUT ALCOHOL: WOULD NEVER DOZE
HOW LIKELY ARE YOU TO NOD OFF OR FALL ASLEEP WHEN YOU ARE A PASSENGER IN A CAR FOR AN HOUR WITHOUT A BREAK: MODERATE CHANCE OF DOZING
HOW LIKELY ARE YOU TO NOD OFF OR FALL ASLEEP WHILE SITTING INACTIVE IN A PUBLIC PLACE: WOULD NEVER DOZE
HOW LIKELY ARE YOU TO NOD OFF OR FALL ASLEEP IN A CAR, WHILE STOPPED FOR A FEW MINUTES IN TRAFFIC: WOULD NEVER DOZE

## 2024-02-28 ENCOUNTER — DOCUMENTATION ONLY (OUTPATIENT)
Dept: SLEEP MEDICINE | Facility: CLINIC | Age: 44
End: 2024-02-28
Attending: NURSE PRACTITIONER
Payer: COMMERCIAL

## 2024-02-28 NOTE — PROGRESS NOTES
HST POST-STUDY QUESTIONNAIRE    What time did you go to bed?  Between 9:30 - 10:00 pm  How long do you think it took to fall asleep?  30 mins  What time did you wake up to start the day?  6:45 am  Did you get up during the night at all?  Yes about 3 times  If you woke up, do you remember approximately what time(s)? No  Did you have any difficulty with the equipment?  Yes at one point the tube fell out of my nose.   Did you us any type of treatment with this study?  None  Was the head of the bed elevated? No  Did you sleep in a recliner?  No  Did you stop using CPAP at least 3 days before this test?  NA  Any other information you'd like us to know?     GWENDOLYN Conway  [facility]

## 2024-02-28 NOTE — PROGRESS NOTES
Pt returned HST device. It was downloaded and forwarded data to the clinical specialist for scoring.    GWENDOLYN Conway  [facility]

## 2024-02-29 ENCOUNTER — OFFICE VISIT (OUTPATIENT)
Dept: FAMILY MEDICINE | Facility: CLINIC | Age: 44
End: 2024-02-29
Payer: COMMERCIAL

## 2024-02-29 VITALS
BODY MASS INDEX: 40.43 KG/M2 | SYSTOLIC BLOOD PRESSURE: 124 MMHG | HEIGHT: 74 IN | HEART RATE: 85 BPM | OXYGEN SATURATION: 98 % | WEIGHT: 315 LBS | DIASTOLIC BLOOD PRESSURE: 88 MMHG | RESPIRATION RATE: 18 BRPM | TEMPERATURE: 97 F

## 2024-02-29 DIAGNOSIS — R41.840 ATTENTION AND CONCENTRATION DEFICIT: ICD-10-CM

## 2024-02-29 DIAGNOSIS — I10 ESSENTIAL HYPERTENSION: ICD-10-CM

## 2024-02-29 DIAGNOSIS — Z00.00 ROUTINE GENERAL MEDICAL EXAMINATION AT A HEALTH CARE FACILITY: Primary | ICD-10-CM

## 2024-02-29 DIAGNOSIS — R53.83 FATIGUE, UNSPECIFIED TYPE: ICD-10-CM

## 2024-02-29 LAB
ERYTHROCYTE [DISTWIDTH] IN BLOOD BY AUTOMATED COUNT: 13 % (ref 10–15)
HBA1C MFR BLD: 6.2 % (ref 0–5.6)
HCT VFR BLD AUTO: 47.5 % (ref 40–53)
HGB BLD-MCNC: 15 G/DL (ref 13.3–17.7)
MCH RBC QN AUTO: 26.1 PG (ref 26.5–33)
MCHC RBC AUTO-ENTMCNC: 31.6 G/DL (ref 31.5–36.5)
MCV RBC AUTO: 83 FL (ref 78–100)
PLATELET # BLD AUTO: 248 10E3/UL (ref 150–450)
RBC # BLD AUTO: 5.74 10E6/UL (ref 4.4–5.9)
WBC # BLD AUTO: 4.1 10E3/UL (ref 4–11)

## 2024-02-29 PROCEDURE — 80061 LIPID PANEL: CPT | Performed by: FAMILY MEDICINE

## 2024-02-29 PROCEDURE — 99214 OFFICE O/P EST MOD 30 MIN: CPT | Mod: 25 | Performed by: FAMILY MEDICINE

## 2024-02-29 PROCEDURE — 85027 COMPLETE CBC AUTOMATED: CPT | Performed by: FAMILY MEDICINE

## 2024-02-29 PROCEDURE — 36415 COLL VENOUS BLD VENIPUNCTURE: CPT | Performed by: FAMILY MEDICINE

## 2024-02-29 PROCEDURE — 83036 HEMOGLOBIN GLYCOSYLATED A1C: CPT | Performed by: FAMILY MEDICINE

## 2024-02-29 PROCEDURE — 80048 BASIC METABOLIC PNL TOTAL CA: CPT | Performed by: FAMILY MEDICINE

## 2024-02-29 PROCEDURE — 99396 PREV VISIT EST AGE 40-64: CPT | Mod: 25 | Performed by: FAMILY MEDICINE

## 2024-02-29 PROCEDURE — 84443 ASSAY THYROID STIM HORMONE: CPT | Performed by: FAMILY MEDICINE

## 2024-02-29 PROCEDURE — 90471 IMMUNIZATION ADMIN: CPT | Performed by: FAMILY MEDICINE

## 2024-02-29 PROCEDURE — 90713 POLIOVIRUS IPV SC/IM: CPT | Performed by: FAMILY MEDICINE

## 2024-02-29 PROCEDURE — 84403 ASSAY OF TOTAL TESTOSTERONE: CPT | Performed by: FAMILY MEDICINE

## 2024-02-29 RX ORDER — DEXTROAMPHETAMINE SACCHARATE, AMPHETAMINE ASPARTATE, DEXTROAMPHETAMINE SULFATE AND AMPHETAMINE SULFATE 5; 5; 5; 5 MG/1; MG/1; MG/1; MG/1
20 TABLET ORAL DAILY
Qty: 30 TABLET | Refills: 0 | Status: SHIPPED | OUTPATIENT
Start: 2024-02-29 | End: 2024-05-29

## 2024-02-29 RX ORDER — LOSARTAN POTASSIUM AND HYDROCHLOROTHIAZIDE 25; 100 MG/1; MG/1
1 TABLET ORAL DAILY
Qty: 90 TABLET | Refills: 3 | Status: SHIPPED | OUTPATIENT
Start: 2024-02-29

## 2024-02-29 SDOH — HEALTH STABILITY: PHYSICAL HEALTH: ON AVERAGE, HOW MANY MINUTES DO YOU ENGAGE IN EXERCISE AT THIS LEVEL?: 0 MIN

## 2024-02-29 SDOH — HEALTH STABILITY: PHYSICAL HEALTH: ON AVERAGE, HOW MANY DAYS PER WEEK DO YOU ENGAGE IN MODERATE TO STRENUOUS EXERCISE (LIKE A BRISK WALK)?: 0 DAYS

## 2024-02-29 ASSESSMENT — SOCIAL DETERMINANTS OF HEALTH (SDOH): HOW OFTEN DO YOU GET TOGETHER WITH FRIENDS OR RELATIVES?: NEVER

## 2024-02-29 NOTE — COMMUNITY RESOURCES LIST (ENGLISH)
02/29/2024    [employer]  N/A  For questions about this resource list or additional care needs, please contact your primary care clinic or care manager.  Phone: [phone]   Email: N/A   Address: [address] [place] [id number]   Hours: N/A        Exercise and Recreation       Gym or workout facility  1  [facility] Distance: 0.86 miles      In-Person   [address] [place] [address]  Language: English  Hours: Mon - Fri 6:30 AM - 9:00 PM , Sat 8:00 AM - 7:00 PM , Sun 9:00 AM - 5:00 PM  Fees: Free, Self Pay   Phone: [phone]) [phone] Email: [email or web address]. Website: [email or web address]     2  [facility] [place] [facility] Distance: 1.55 miles      In-Person   [address] [place] [address]  Language: English  Hours: Mon - Thu 2:00 PM - 9:00 PM , Fri 2:00 PM - 6:00 PM  Fees: Free, Self Pay   Phone: [phone]) [phone] Email: [email or web address]. Website: [email or web address]          Important Numbers & Websites       Emergency Services   911  [facility]   311  Poison Control   (139) 533-2685  Suicide Prevention Lifeline   (343) 437-8036 (TALK)  Child Abuse Hotline   (528) 822-9745 (4-A-Child)  Sexual Assault Hotline   (167) 532-7335 (HOPE)  National Runaway Safeline   (715) 914-8110 (RUNAWAY)  All-Options Talkline   (122) 218-2931  Substance Abuse Referral   (883) 662-1341 (HELP)

## 2024-02-29 NOTE — PROGRESS NOTES
"Preventive Care Visit  [facility]  Tashi Sin MD, Family Medicine  Feb 29, 2024      Assessment & Plan     Routine general medical examination at a health care facility  Remains prediabetic range with A1c.  Discussed potential for GLP-1 medication for weight loss.  Reducing weight may help with BP control as well.  - Basic metabolic panel  (Ca, Cl, CO2, Creat, Gluc, K, Na, BUN)  - Lipid panel reflex to direct LDL Fasting  - CBC with platelets  - Hemoglobin A1c    Fatigue, unspecified type  Check thyroid and per his request, testosterone  - Testosterone, total  - TSH with free T4 reflex    Essential hypertension  Finally controlled  He would like to reduce pill number.  - losartan-hydrochlorothiazide (HYZAAR) 100-25 MG tablet  Dispense: 90 tablet; Refill: 3    Attention and concentration deficit  Options discussed  Will change to an Adderall med.  Discussed symptoms to monitor.  - amphetamine-dextroamphetamine (ADDERALL) 20 MG tablet  Dispense: 30 tablet; Refill: 0    Patient has been advised of split billing requirements and indicates understanding: Yes    BMI  Estimated body mass index is 46.31 kg/m  as calculated from the following:    Height as of this encounter: 1.87 m (6' 1.62\").    Weight as of this encounter: 161.9 kg (357 lb).     Counseling  Appropriate preventive services were discussed with this patient, including applicable screening as appropriate for fall prevention, nutrition, physical activity, Tobacco-use cessation, weight loss and cognition.  Checklist reviewing preventive services available has been given to the patient.  Reviewed patient's diet, addressing concerns and/or questions.   Patient is at risk for social isolation and has been provided with information about the benefit of social connection.   The patient was instructed to see the dentist every 6 months.     Debo Flowers is a 43 year old, presenting for the following:  Physical (Pt need form filled " out)        2/29/2024     8:34 AM   Additional Questions   Roomed by [facility] Care Directive  Patient does not have a Health Care Directive or Living Will: Discussed advance care planning with patient; information given to patient to review.  Full Code   Wife would be agent.    History of Present Illness     Asthma:  He presents for follow up of asthma.  He has no cough, no wheezing, and some shortness of breath.  He is using a relief medication a few times a week. He does not have a controller medication. Patient is aware of the following triggers: unaware of any triggers. The patient has not had a visit to the Emergency Room, Urgent Care or Hospital due to asthma since the last clinic visit.      Hypertension Follow-up    Do you check your blood pressure regularly outside of the clinic? No   Are you following a low salt diet? No  Are your blood pressures ever more than 140 on the top number (systolic) OR more   than 90 on the bottom number (diastolic), for example 140/90? No    Asthma Follow-Up    Was ACT completed today?  Yes        1/16/2024    10:39 AM   ACT Total Scores   ACT TOTAL SCORE (Goal Greater than or Equal to 20) 25   In the past 12 months, how many times did you visit the emergency room for your asthma without being admitted to the hospital? 0   In the past 12 months, how many times were you hospitalized overnight because of your asthma? 0        How many days per week do you miss taking your asthma controller medication?  I do not have an asthma controller medication  Have you had any Emergency Room Visits, Urgent Care Visits, or Hospital Admissions since your last office visit?  No    ADHD  Tried med.  Was not sure if it was effective.  Was costly.  Wanted to stop it because of that.  Would consider a different medication.        2/29/2024   General Health   How would you rate your overall physical health? (!) FAIR   Feel stress (tense, anxious, or unable to sleep) Rather much   (!)  STRESS CONCERN      2/29/2024   Nutrition   Three or more servings of calcium each day? (!) NO   Diet: Regular (no restrictions)   How many servings of fruit and vegetables per day? (!) I DON'T KNOW   How many sweetened beverages each day? 0-1         2/29/2024   Exercise   Days per week of moderate/strenous exercise 0 days   Average minutes spent exercising at this level 0 min   (!) EXERCISE CONCERN      2/29/2024   Social Factors   Frequency of gathering with friends or relatives Never   Worry food won't last until get money to buy more No   Food not last or not have enough money for food? No   Do you have housing?  Yes   Are you worried about losing your housing? No   Lack of transportation? No   Unable to get utilities (heat,electricity)? No   (!) SOCIAL CONNECTIONS CONCERN      2/29/2024   Dental   Dentist two times every year? (!) NO         2/29/2024   TB Screening   Were you born outside of [place]?  No          Today's PHQ-2 Score:       1/15/2024     8:26 PM   PHQ-2 ( 1999 [employer])   Q1: Little interest or pleasure in doing things 0   Q2: Feeling down, depressed or hopeless 0   PHQ-2 Score 0   Q1: Little interest or pleasure in doing things Not at all   Q2: Feeling down, depressed or hopeless Not at all   PHQ-2 Score 0         2/29/2024   Substance Use   Alcohol more than 3/day or more than 7/wk Not Applicable   Do you use any other substances recreationally? No     Social History     Tobacco Use    Smoking status: Never     Passive exposure: Never    Smokeless tobacco: Never   Vaping Use    Vaping Use: Never used   Substance Use Topics    Alcohol use: Not Currently    Drug use: Never           2/29/2024   STI Screening   New sexual partner(s) since last STI/HIV test? No   ASCVD Risk   The 10-year ASCVD risk score (Joie GIMENEZ, et al., 2019) is: 5.1%    Values used to calculate the score:      Age: 43 years      Sex: Male      Is Non- : Yes      Diabetic: No      Tobacco smoker:  No      Systolic Blood Pressure: 124 mmHg      Is BP treated: Yes      HDL Cholesterol: 46 mg/dL      Total Cholesterol: 177 mg/dL       Reviewed and updated as needed this visit by Provider   Tobacco     Med Hx  Surg Hx  Fam Hx  Soc Hx Sexual Activity          Past Medical History:   Diagnosis Date    Hypertension     Uncomplicated asthma      Past Surgical History:   Procedure Laterality Date    BIOPSY  1993    IMPLANT SHUNT VENTRICULOPERITONEAL  05/14/1993    VASECTOMY       BP Readings from Last 3 Encounters:   02/29/24 124/88   01/16/24 (!) 139/94   12/14/23 (!) 144/94    Wt Readings from Last 3 Encounters:   02/29/24 (!) 161.9 kg (357 lb)   02/27/24 (!) 165.2 kg (364 lb 2 oz)   01/16/24 (!) 163.3 kg (360 lb)          Patient Active Problem List   Diagnosis    Class 3 severe obesity due to excess calories with serious comorbidity and body mass index (BMI) of 45.0 to 49.9 in adult (H)    Primary insomnia    Brain tumor (benign) (H)    Essential hypertension    Impaired fasting blood sugar    Lesion of basal ganglia    History of brain shunt    Adjustment reaction    Moderate asthma without complication     Past Surgical History:   Procedure Laterality Date    BIOPSY  1993    IMPLANT SHUNT VENTRICULOPERITONEAL  05/14/1993    VASECTOMY         Social History     Tobacco Use    Smoking status: Never     Passive exposure: Never    Smokeless tobacco: Never   Substance Use Topics    Alcohol use: Not Currently     Family History   Problem Relation Age of Onset    Diabetes Type 2  Maternal Grandmother     Diabetes Maternal Grandmother     Hypertension Maternal Grandmother     Depression Maternal Grandmother     Obesity Mother          Current Outpatient Medications   Medication Sig Dispense Refill    albuterol (PROAIR HFA/PROVENTIL HFA/VENTOLIN HFA) 108 (90 Base) MCG/ACT inhaler Inhale 2 puffs into the lungs every 6 hours as needed for shortness of breath / dyspnea or wheezing 18 g 1    amLODIPine (NORVASC) 10 MG  "tablet TAKE 1 TABLET (10 MG) BY MOUTH DAILY 30 tablet 1    amphetamine-dextroamphetamine (ADDERALL) 20 MG tablet Take 1 tablet (20 mg) by mouth daily 30 tablet 0    losartan-hydrochlorothiazide (HYZAAR) 100-25 MG tablet Take 1 tablet by mouth daily 90 tablet 3    MELATONIN GUMMIES PO       metFORMIN (GLUCOPHAGE XR) 500 MG 24 hr tablet       vitamin D3 (CHOLECALCIFEROL) 50 mcg (2000 units) tablet Take 1 tablet (50 mcg) by mouth daily 90 tablet 01     Allergies   Allergen Reactions    Lisinopril Other (See Comments)     Coughing      Recent Labs   Lab Test 02/29/24  0949 02/13/23  1104 10/14/22  0839 01/27/22  0906 03/10/21  0808 01/22/21  2330   A1C 6.2* 6.1* 6.1*   < > 6.0*  --    * 97 97   < > 102  --    HDL 46 45 47   < > 45  --    TRIG 66 58 54   < > 54  --    ALT  --   --  23  --   --  51   CR 1.20* 1.03 1.00   < > 0.95 1.12   GFRESTIMATED 78 >90 >90   < > >60 83   GFRESTBLACK  --   --   --   --  >60 >90   POTASSIUM 4.1 4.0 4.0   < > 4.0 2.9*   TSH 2.04  --  2.15  --  1.27  --     < > = values in this interval not displayed.         Objective    Exam  /88 (BP Location: Right arm, Patient Position: Sitting, Cuff Size: Thigh)   Pulse 85   Temp 97  F (36.1  C) (Temporal)   Resp 18   Ht 1.87 m (6' 1.62\")   Wt (!) 161.9 kg (357 lb)   SpO2 98%   BMI 46.31 kg/m     Estimated body mass index is 46.31 kg/m  as calculated from the following:    Height as of this encounter: 1.87 m (6' 1.62\").    Weight as of this encounter: 161.9 kg (357 lb).    Wt Readings from Last 3 Encounters:   02/29/24 (!) 161.9 kg (357 lb)   02/27/24 (!) 165.2 kg (364 lb 2 oz)   01/16/24 (!) 163.3 kg (360 lb)     Physical Exam  Gen:   Alert, not distressed  Head:   Normocephalic, without obvious abnormality, atraumatic  Eyes:   PERRL, conjunctiva/corneas clear, EOM's intact  Ears:   Normal tympanic membranes and external ear canals  Nose:    Mucosa normal, no drainage or sinus tenderness  Throat:    No erythema or " exudates  Neck:    No adenopathy no nodules in thyroid, normal ROM  Lungs:    Clear to auscultation bilaterally, respirations unlabored  Chest wall:  No tenderness or deformity  Heart:     Regular, normal S1 and S2, no murmur, gallop or rub  Abdomen:  Soft, non-tender, normal bowel sounds, no masses, no organomegaly  Back:    Symmetric, no curvature, ROM normal, no CVA tenderness  Extremities:   Extremities normal, atraumatic, no cyanosis or edema  Skin:     Skin color, texture, turgor normal, no rashes or lesions  Lymph nodes:   Cervical and supraclavicular nodes normal  Neurologic:   CNII-XII intact.   DTRs normal and symmetric.  Symmetric strength and sensation.    Recent Results (from the past 240 hour(s))   Basic metabolic panel  (Ca, Cl, CO2, Creat, Gluc, K, Na, BUN)    Collection Time: 02/29/24  9:49 AM   Result Value Ref Range    Sodium 138 135 - 145 mmol/L    Potassium 4.1 3.4 - 5.3 mmol/L    Chloride 101 98 - 107 mmol/L    Carbon Dioxide (CO2) 27 22 - 29 mmol/L    Anion Gap 10 7 - 15 mmol/L    Urea Nitrogen 17.7 6.0 - 20.0 mg/dL    Creatinine 1.20 (H) 0.67 - 1.17 mg/dL    GFR Estimate 78 >60 mL/min/1.73m2    Calcium 9.7 8.6 - 10.0 mg/dL    Glucose 98 70 - 99 mg/dL   Lipid panel reflex to direct LDL Fasting    Collection Time: 02/29/24  9:49 AM   Result Value Ref Range    Cholesterol 177 <200 mg/dL    Triglycerides 66 <150 mg/dL    Direct Measure HDL 46 >=40 mg/dL    LDL Cholesterol Calculated 118 (H) <=100 mg/dL    Non HDL Cholesterol 131 (H) <130 mg/dL    Patient Fasting > 8hrs? Yes    CBC with platelets    Collection Time: 02/29/24  9:49 AM   Result Value Ref Range    WBC Count 4.1 4.0 - 11.0 10e3/uL    RBC Count 5.74 4.40 - 5.90 10e6/uL    Hemoglobin 15.0 13.3 - 17.7 g/dL    Hematocrit 47.5 40.0 - 53.0 %    MCV 83 78 - 100 fL    MCH 26.1 (L) 26.5 - 33.0 pg    MCHC 31.6 31.5 - 36.5 g/dL    RDW 13.0 10.0 - 15.0 %    Platelet Count 248 150 - 450 10e3/uL   Hemoglobin A1c    Collection Time: 02/29/24  9:49  AM   Result Value Ref Range    Hemoglobin A1C 6.2 (H) 0.0 - 5.6 %   TSH with free T4 reflex    Collection Time: 02/29/24  9:49 AM   Result Value Ref Range    TSH 2.04 0.30 - 4.20 uIU/mL        Prior to immunization administration, verified patients identity using patient s name and date of birth. Please see Immunization Activity for additional information.     Screening Questionnaire for Adult Immunization    Are you sick today?   No   Do you have allergies to medications, food, a vaccine component or latex?   No   Have you ever had a serious reaction after receiving a vaccination?   No   Do you have a long-term health problem with heart, lung, kidney, or metabolic disease (e.g., diabetes), asthma, a blood disorder, no spleen, complement component deficiency, a cochlear implant, or a spinal fluid leak?  Are you on long-term aspirin therapy?   Yes   Do you have cancer, leukemia, HIV/AIDS, or any other immune system problem?   No   Do you have a parent, brother, or sister with an immune system problem?   No   In the past 3 months, have you taken medications that affect  your immune system, such as prednisone, other steroids, or anticancer drugs; drugs for the treatment of rheumatoid arthritis, Crohn s disease, or psoriasis; or have you had radiation treatments?   No   Have you had a seizure, or a brain or other nervous system problem?   Yes   During the past year, have you received a transfusion of blood or blood    products, or been given immune (gamma) globulin or antiviral drug?   No   For women: Are you pregnant or is there a chance you could become       pregnant during the next month?   No   Have you received any vaccinations in the past 4 weeks?   No     Immunization questionnaire answers were all negative.      Patient instructed to remain in clinic for 15 minutes afterwards, and to report any adverse reactions.     Screening performed by Hussain Blankenshpi on 2/29/2024 at 8:40 AM.     Signed Electronically by:  Tashi Sin MD

## 2024-02-29 NOTE — PROCEDURES
"HOME SLEEP STUDY INTERPRETATION    Patient: Lionel Espitia Jr.  MRN: [id number]  YOB: 1980  Study Date: 2/27/2024  Referring Provider: Tashi Sin MD  Ordering Provider: Neftali OSHEA CNP     Indications for Home Study: Lionel Espitia Jr. is a 43 year old male who presents with symptoms suggestive of obstructive sleep apnea.    Estimated body mass index is 46.75 kg/m  as calculated from the following:    Height as of this encounter: 1.88 m (6' 2\").    Weight as of this encounter: 165.2 kg (364 lb 2 oz).  Total score - Walford: 5 (2/27/2024  1:45 PM)  Total Score: 7 (2/27/2024  1:46 PM)    Data: A full night home sleep study was performed recording the standard physiologic parameters including body position, movement, sound, nasal pressure, thermal oral airflow, chest and abdominal movements with respiratory inductance plethysmography, and oxygen saturation by pulse oximetry. Pulse rate was estimated by oximetry recording. This study was considered adequate based on > 4 hours of quality oximetry and respiratory recording. As specified by the AASM Manual for the Scoring of Sleep and Associated events, version 2.3, Rule VIII.D 1B, 4% oxygen desaturation scoring for hypopneas is used as a standard of care on all home sleep apnea testing.    Analysis Time:  533.3 minutes    Respiration:   Sleep Associated Hypoxemia: sustained hypoxemia was not present. Baseline oxygen saturation was 97%.  Time with saturation less than or equal to 88% was 1.6 minutes. The lowest oxygen saturation was 80%.   Snoring: Snoring was present.  Respiratory events: The home study revealed a presence of 29 obstructive apneas and 10 mixed and central apneas. There were 95 hypopneas resulting in a combined apnea/hypopnea index [AHI] of 15.1 events per hour.  AHI was 10.7 per hour supine, 10.2 per hour prone, 20.7 per hour on left side, and 59.9 per hour on right side.   Pattern: Excluding events noted above, respiratory " rate and pattern was Normal.    Position: Percent of time spent: supine - 38.7%, prone - 20.9%, on left - 39.2%, on right - 1.1%.    Heart Rate: By pulse oximetry normal rate was noted.     Assessment:   Moderate obstructive sleep apnea.  Sleep associated hypoxemia was not present.    Recommendations:  Consider auto-CPAP at 5-15 cmH2O, oral appliance therapy, or positional therapy.  Suggest optimizing sleep hygiene and avoiding sleep deprivation.  Weight management.    Diagnosis Code(s): Obstructive Sleep Apnea G47.33    Tino Bansal DO, February 29, 2024   Diplomate, American Board of Internal Medicine, Sleep Medicine

## 2024-02-29 NOTE — PROGRESS NOTES
This HSAT was performed using a Noxturnal T3 device which recorded snore, sound, movement activity, body position, nasal pressure, oronasal thermal airflow, pulse, oximetry and both chest and abdominal respiratory effort. HSAT data was restricted to the time patient states they were in bed.     HSAT was scored using 1B 4% hypopnea rule.     AHI: 15.1.  Snoring was reported as loud.  Time with SpO2 below 89% was 3.1 minutes.   Overall signal quality was good     Pt will follow up with sleep provider to determine appropriate therapy.     Ordering Provider, Neftali Houston APRN CNP C. Oyugi, BA, [facility], [facility] Clinical Specialist/ 2/29/2024

## 2024-02-29 NOTE — PATIENT INSTRUCTIONS
Preventive Care Advice   This is general advice given by our system to help you stay healthy. However, your care team may have specific advice just for you. Please talk to your care team about your preventive care needs.  Nutrition  Eat 5 or more servings of fruits and vegetables each day.  Try wheat bread, brown rice and whole grain pasta (instead of white bread, rice, and pasta).  Get enough calcium and vitamin D. Check the label on foods and aim for 100% of the RDA (recommended daily allowance).  Lifestyle  Exercise at least 150 minutes each week   (30 minutes a day, 5 days a week).  Do muscle strengthening activities 2 days a week. These help control your weight and prevent disease.  No smoking.  Wear sunscreen to prevent skin cancer.  Have a dental exam and cleaning every 6 months.  Yearly exams  See your health care team every year to talk about:  Any changes in your health.  Any medicines your care team has prescribed.  Preventive care, family planning, and ways to prevent chronic diseases.  Shots (vaccines)   HPV shots (up to age 26), if you've never had them before.  Hepatitis B shots (up to age 59), if you've never had them before.  COVID-19 shot: Get this shot when it's due.  Flu shot: Get a flu shot every year.  Tetanus shot: Get a tetanus shot every 10 years.  Pneumococcal, hepatitis A, and RSV shots: Ask your care team if you need these based on your risk.  Shingles shot (for age 50 and up).  General health tests  Diabetes screening:  Starting at age 35, Get screened for diabetes at least every 3 years.  If you are younger than age 35, ask your care team if you should be screened for diabetes.  Cholesterol test: At age 39, start having a cholesterol test every 5 years, or more often if advised.  Bone density scan (DEXA): At age 50, ask your care team if you should have this scan for osteoporosis (brittle bones).  Hepatitis C: Get tested at least once in your life.  STIs (sexually transmitted  infections)  Before age 24: Ask your care team if you should be screened for STIs.  After age 24: Get screened for STIs if you're at risk. You are at risk for STIs (including HIV) if:  You are sexually active with more than one person.  You don't use condoms every time.  You or a partner was diagnosed with a sexually transmitted infection.  If you are at risk for HIV, ask about PrEP medicine to prevent HIV.  Get tested for HIV at least once in your life, whether you are at risk for HIV or not.  Cancer screening tests  Cervical cancer screening: If you have a cervix, begin getting regular cervical cancer screening tests at age 21. Most people who have regular screenings with normal results can stop after age 65. Talk about this with your provider.  Breast cancer scan (mammogram): If you've ever had breasts, begin having regular mammograms starting at age 40. This is a scan to check for breast cancer.  Colon cancer screening: It is important to start screening for colon cancer at age 45.  Have a colonoscopy test every 10 years (or more often if you're at risk) Or, ask your provider about stool tests like a FIT test every year or Cologuard test every 3 years.  To learn more about your testing options, visit: https://[email or web address]/446862.pdf.  For help making a decision, visit: https://bit.ly/om69147.  Prostate cancer screening test: If you have a prostate and are age 55 to 69, ask your provider if you would benefit from a yearly prostate cancer screening test.  Lung cancer screening: If you are a current or former smoker age 50 to 80, ask your care team if ongoing lung cancer screenings are right for you.  For informational purposes only. Not to replace the advice of your health care provider. Copyright   2023 [employer]. All rights reserved. Clinically reviewed by the [facility] Transitions Program. [employer] 493622 - REV 01/24.    Relationships for Good Health  Relationships are important for  our health and happiness. Social isolation, loneliness and lack of support are bad for your health. Studies show that loneliness can harm health and limit your life span as much as high blood pressure and smoking.   Take some time to reflect on your relationships. Then answer these questions:  Are there people in your life that cause you stress or drain your energy? What can you do to set limits?  ________________________________________________________________________________________________________________________________________________________________________________________________________________________________________________________________________________________________________________________________________________  Who do you enjoy spending time with? Who can you go to for support?  ________________________________________________________________________________________________________________________________________________________________________________________________________________________________________________________________________________________________________________________________________________  What can you do to improve your relationships with others?  __________________________________________________________________________________________________________________________________________________________________________________________________________________  ______________________________________________________________________________________________________________________________  What do you like most about your relationships with others?  ________________________________________________________________________________________________________________________________________________________________________________________________________________________________________________________________________________________________________________________________________________  My  goal: ______________________________________________________________________  I will ______________________________________________________________________________________________________________________________________________________________________________________________    For informational purposes only. Not to replace the advice of your health care provider. Copyright   2018 [facility]. All rights reserved. Clinically reviewed by [facility]  Team. [employer] 727145 - Rev 04/21.    Learning About Stress  What is stress?     Stress is your body's response to a hard situation. Your body can have a physical, emotional, or mental response. Stress is a fact of life for most people, and it affects everyone differently. What causes stress for you may not be stressful for someone else.  A lot of things can cause stress. You may feel stress when you go on a job interview, take a test, or run a race. This kind of short-term stress is normal and even useful. It can help you if you need to work hard or react quickly. For example, stress can help you finish an important job on time.  Long-term stress is caused by ongoing stressful situations or events. Examples of long-term stress include long-term health problems, ongoing problems at work, or conflicts in your family. Long-term stress can harm your health.  How does stress affect your health?  When you are stressed, your body responds as though you are in danger. It makes hormones that speed up your heart, make you breathe faster, and give you a burst of energy. This is called the fight-or-flight stress response. If the stress is over quickly, your body goes back to normal and no harm is done.  But if stress happens too often or lasts too long, it can have bad effects. Long-term stress can make you more likely to get sick, and it can make symptoms of some diseases worse. If you tense up when you are stressed, you may develop neck, shoulder, or low  back pain. Stress is linked to high blood pressure and heart disease.  Stress also harms your emotional health. It can make you granados, tense, or depressed. Your relationships may suffer, and you may not do well at work or school.  What can you do to manage stress?  You can try these things to help manage stress:   Do something active. Exercise or activity can help reduce stress. Walking is a great way to get started. Even everyday activities such as housecleaning or yard work can help.  Try yoga or katey chi. These techniques combine exercise and meditation. You may need some training at first to learn them.  Do something you enjoy. For example, listen to music or go to a movie. Practice your hobby or do volunteer work.  Meditate. This can help you relax, because you are not worrying about what happened before or what may happen in the future.  Do guided imagery. Imagine yourself in any setting that helps you feel calm. You can use online videos, books, or a teacher to guide you.  Do breathing exercises. For example:  From a standing position, bend forward from the waist with your knees slightly bent. Let your arms dangle close to the floor.  Breathe in slowly and deeply as you return to a standing position. Roll up slowly and lift your head last.  Hold your breath for just a few seconds in the standing position.  Breathe out slowly and bend forward from the waist.  Let your feelings out. Talk, laugh, cry, and express anger when you need to. Talking with supportive friends or family, a counselor, or a ericka leader about your feelings is a healthy way to relieve stress. Avoid discussing your feelings with people who make you feel worse.  Write. It may help to write about things that are bothering you. This helps you find out how much stress you feel and what is causing it. When you know this, you can find better ways to cope.  What can you do to prevent stress?  You might try some of these things to help prevent  "stress:  Manage your time. This helps you find time to do the things you want and need to do.  Get enough sleep. Your body recovers from the stresses of the day while you are sleeping.  Get support. Your family, friends, and community can make a difference in how you experience stress.  Limit your news feed. Avoid or limit time on social media or news that may make you feel stressed.  Do something active. Exercise or activity can help reduce stress. Walking is a great way to get started.  Where can you learn more?  Go to https://www.Passbox.net/patiented  Enter N032 in the search box to learn more about \"Learning About Stress.\"  Current as of: February 26, 2023               Content Version: 13.8    5651-9189 [employer].   Care instructions adapted under license by your healthcare professional. If you have questions about a medical condition or this instruction, always ask your healthcare professional. Healthwise, [employer] disclaims any warranty or liability for your use of this information.      "

## 2024-02-29 NOTE — LETTER
My Asthma Action Plan  Name: Lionel Espitia Jr.   YOB: 1980  Date: 2/29/2024   My doctor: Tashi Sin   My clinic: [facility]      My Rescue Medicine: Albuterol        Dose: 108 (90 Base    My Asthma Severity: Intermittent / Exercise Induced  Avoid your asthma triggers:         GREEN ZONE   Good Control  I feel good  No cough or wheeze  Can work, sleep and play without asthma symptoms       Take your asthma control medicine every day.     If exercise triggers your asthma, take your rescue medication  15 minutes before exercise or sports, and  During exercise if you have asthma symptoms  Spacer to use with inhaler: If you have a spacer, make sure to use it with your inhaler             YELLOW ZONE Getting Worse  I have ANY of these:  I do not feel good  Cough or wheeze  Chest feels tight  Wake up at night   Keep taking your Green Zone medications  Start taking your rescue medicine:  every 20 minutes for up to 1 hour. Then every 4 hours for 24-48 hours.  If you stay in the Yellow Zone for more than 12-24 hours, contact your doctor.  If you do not return to the Green Zone in 12-24 hours or you get worse, start taking your oral steroid medicine if prescribed by your provider.           RED ZONE Medical Alert - Get Help  I have ANY of these:  I feel awful  Medicine is not helping  Breathing getting harder  Trouble walking or talking  Nose opens wide to breathe       Take your rescue medicine NOW  If your provider has prescribed an oral steroid medicine, start taking it NOW  Call your doctor NOW  If you are still in the Red Zone after 20 minutes and you have not reached your doctor:  Take your rescue medicine again and  Call 911 or go to the emergency room right away    See your regular doctor within 2 weeks of an Emergency Room or Urgent Care visit for follow-up treatment.        The above medication may be given at school or day care?: Yes and N/A (Adult Patient)  Child can  carry and use inhaler(s) at school with approval of school nurse?: Yes and N/A (Adult Patient)      Annual Reminders:  Meet with Asthma Educator,  Flu Shot in the Fall, consider Pneumonia Vaccination for patients with asthma (aged 19 and older).    Pharmacy:    [facility] - [place], [place] - [address] [facility] - [place], [place] - [address]                    Asthma Triggers  How To Control Things That Make Your Asthma Worse    Triggers are things that make your asthma worse.  Look at the list below to help you find your triggers and what you can do about them.  You can help prevent asthma flare-ups by staying away from your triggers.      Trigger                                                          What you can do   Cigarette Smoke  Tobacco smoke can make asthma worse. Do not allow smoking in your home, car or around you.  Be sure no one smokes at a child s day care or school.  If you smoke, ask your health care provider for ways to help you quit.  Ask family members to quit too.  Ask your health care provider for a referral to Quit Plan to help you quit smoking, or call 5-259-464-PLAN.     Colds, Flu, Bronchitis  These are common triggers of asthma. Wash your hands often.  Don t touch your eyes, nose or mouth.  Get a flu shot every year.     Dust Mites  These are tiny bugs that live in cloth or carpet. They are too small to see. Wash sheets and blankets in hot water every week.   Encase pillows and mattress in dust mite proof covers.  Avoid having carpet if you can. If you have carpet, vacuum weekly.   Use a dust mask and HEPA vacuum.   Pollen and Outdoor Mold  Some people are allergic to trees, grass, or weed pollen, or molds. Try to keep your windows closed.  Limit time out doors when pollen count is high.   Ask you health care provider about taking medicine during allergy season.     Animal Dander  Some people are allergic to skin flakes, urine or  saliva from pets with fur or feathers. Keep pets with fur or feathers out of your home.    If you can t keep the pet outdoors, then keep the pet out of your bedroom.  Keep the bedroom door closed.  Keep pets off cloth furniture and away from stuffed toys.     Mice, Rats, and Cockroaches  Some people are allergic to the waste from these pests.   Cover food and garbage.  Clean up spills and food crumbs.  Store grease in the refrigerator.   Keep food out of the bedroom.   Indoor Mold  This can be a trigger if your home has high moisture. Fix leaking faucets, pipes, or other sources of water.   Clean moldy surfaces.  Dehumidify basement if it is damp and smelly.   Smoke, Strong Odors, and Sprays  These can reduce air quality. Stay away from strong odors and sprays, such as perfume, powder, hair spray, paints, smoke incense, paint, cleaning products, candles and new carpet.   Exercise or Sports  Some people with asthma have this trigger. Be active!  Ask your doctor about taking medicine before sports or exercise to prevent symptoms.    Warm up for 5-10 minutes before and after sports or exercise.     Other Triggers of Asthma  Cold air:  Cover your nose and mouth with a scarf.  Sometimes laughing or crying can be a trigger.  Some medicines and food can trigger asthma.

## 2024-03-01 LAB
ANION GAP SERPL CALCULATED.3IONS-SCNC: 10 MMOL/L (ref 7–15)
BUN SERPL-MCNC: 17.7 MG/DL (ref 6–20)
CALCIUM SERPL-MCNC: 9.7 MG/DL (ref 8.6–10)
CHLORIDE SERPL-SCNC: 101 MMOL/L (ref 98–107)
CHOLEST SERPL-MCNC: 177 MG/DL
CREAT SERPL-MCNC: 1.2 MG/DL (ref 0.67–1.17)
DEPRECATED HCO3 PLAS-SCNC: 27 MMOL/L (ref 22–29)
EGFRCR SERPLBLD CKD-EPI 2021: 78 ML/MIN/1.73M2
FASTING STATUS PATIENT QL REPORTED: YES
GLUCOSE SERPL-MCNC: 98 MG/DL (ref 70–99)
HDLC SERPL-MCNC: 46 MG/DL
LDLC SERPL CALC-MCNC: 118 MG/DL
NONHDLC SERPL-MCNC: 131 MG/DL
POTASSIUM SERPL-SCNC: 4.1 MMOL/L (ref 3.4–5.3)
SODIUM SERPL-SCNC: 138 MMOL/L (ref 135–145)
TRIGL SERPL-MCNC: 66 MG/DL
TSH SERPL DL<=0.005 MIU/L-ACNC: 2.04 UIU/ML (ref 0.3–4.2)

## 2024-03-03 LAB — TESTOST SERPL-MCNC: 400 NG/DL (ref 240–950)

## 2024-03-13 ENCOUNTER — TELEPHONE (OUTPATIENT)
Dept: FAMILY MEDICINE | Facility: CLINIC | Age: 44
End: 2024-03-13
Payer: COMMERCIAL

## 2024-03-13 NOTE — TELEPHONE ENCOUNTER
L/M for pt to call back, just wanting to confirm if he wanted to keep his appointment at [facility] or change it to the [facility] one.The one where his PCP is.

## 2024-03-13 NOTE — TELEPHONE ENCOUNTER
----- Message from Jose Miguel Grullon RN sent at 3/13/2024 10:22 AM CDT -----  Regarding: BP check 3/15  Please contact patient and see if he wants to change his BP check to the [facility] clinic. He has never been seen at [facility].

## 2024-03-15 ENCOUNTER — ALLIED HEALTH/NURSE VISIT (OUTPATIENT)
Dept: FAMILY MEDICINE | Facility: CLINIC | Age: 44
End: 2024-03-15
Payer: COMMERCIAL

## 2024-03-15 VITALS — HEART RATE: 68 BPM | SYSTOLIC BLOOD PRESSURE: 136 MMHG | DIASTOLIC BLOOD PRESSURE: 76 MMHG | OXYGEN SATURATION: 99 %

## 2024-03-15 DIAGNOSIS — I10 ESSENTIAL HYPERTENSION: Primary | ICD-10-CM

## 2024-03-15 PROCEDURE — 99207 PR NO CHARGE NURSE ONLY: CPT

## 2024-03-15 NOTE — PROGRESS NOTES
I met with Lionel Espitia Jr. at the request of Tashi Sin MD to recheck his blood pressure. Blood pressure medications on the med list were reviewed with patient.  Lionel mentions that he recently had changes to his blood pressure medication, but he has not yet began taking combined pill due to using remaining supply first.   Patient has taken all medications as per usual regimen: Yes  Patient reports tolerating them without any issues or concerns: Yes    Vitals:    03/15/24 1025 03/15/24 1032   BP: (!) 142/80 136/76   BP Location: Left arm Left arm   Patient Position: Sitting Sitting   Cuff Size: Adult Large Adult Large   Pulse: 68    SpO2: 99%        Blood pressure was taken, previous encounter was reviewed, initial blood pressure 142/80. After discussing and reviewing medications, blood pressure was recheck and recorded blood pressure below 140/90.  Patient was discharged and the note will be sent to the provider for final review.

## 2024-04-01 DIAGNOSIS — I10 ESSENTIAL HYPERTENSION: ICD-10-CM

## 2024-04-03 RX ORDER — AMLODIPINE BESYLATE 10 MG/1
10 TABLET ORAL DAILY
Qty: 30 TABLET | Refills: 1 | Status: SHIPPED | OUTPATIENT
Start: 2024-04-03

## 2024-04-25 PROBLEM — G47.33 OSA (OBSTRUCTIVE SLEEP APNEA): Status: ACTIVE | Noted: 2024-04-25

## 2024-05-28 DIAGNOSIS — R41.840 ATTENTION AND CONCENTRATION DEFICIT: ICD-10-CM

## 2024-05-29 RX ORDER — DEXTROAMPHETAMINE SACCHARATE, AMPHETAMINE ASPARTATE, DEXTROAMPHETAMINE SULFATE AND AMPHETAMINE SULFATE 5; 5; 5; 5 MG/1; MG/1; MG/1; MG/1
20 TABLET ORAL DAILY
Qty: 30 TABLET | Refills: 0 | Status: SHIPPED | OUTPATIENT
Start: 2024-05-29

## 2024-09-27 ENCOUNTER — MYC MEDICAL ADVICE (OUTPATIENT)
Dept: FAMILY MEDICINE | Facility: CLINIC | Age: 44
End: 2024-09-27
Payer: COMMERCIAL

## 2024-09-30 ENCOUNTER — VIRTUAL VISIT (OUTPATIENT)
Dept: FAMILY MEDICINE | Facility: CLINIC | Age: 44
End: 2024-09-30
Payer: COMMERCIAL

## 2024-09-30 DIAGNOSIS — T78.40XA ALLERGY, INITIAL ENCOUNTER: Primary | ICD-10-CM

## 2024-09-30 PROCEDURE — 99213 OFFICE O/P EST LOW 20 MIN: CPT | Mod: 95 | Performed by: STUDENT IN AN ORGANIZED HEALTH CARE EDUCATION/TRAINING PROGRAM

## 2024-09-30 ASSESSMENT — ASTHMA QUESTIONNAIRES
QUESTION_5 LAST FOUR WEEKS HOW WOULD YOU RATE YOUR ASTHMA CONTROL: COMPLETELY CONTROLLED
QUESTION_2 LAST FOUR WEEKS HOW OFTEN HAVE YOU HAD SHORTNESS OF BREATH: ONCE OR TWICE A WEEK
ACT_TOTALSCORE: 24
ACT_TOTALSCORE: 24
QUESTION_4 LAST FOUR WEEKS HOW OFTEN HAVE YOU USED YOUR RESCUE INHALER OR NEBULIZER MEDICATION (SUCH AS ALBUTEROL): NOT AT ALL
QUESTION_1 LAST FOUR WEEKS HOW MUCH OF THE TIME DID YOUR ASTHMA KEEP YOU FROM GETTING AS MUCH DONE AT WORK, SCHOOL OR AT HOME: NONE OF THE TIME
QUESTION_3 LAST FOUR WEEKS HOW OFTEN DID YOUR ASTHMA SYMPTOMS (WHEEZING, COUGHING, SHORTNESS OF BREATH, CHEST TIGHTNESS OR PAIN) WAKE YOU UP AT NIGHT OR EARLIER THAN USUAL IN THE MORNING: NOT AT ALL

## 2024-09-30 NOTE — PROGRESS NOTES
Lionel is a 43 year old who is being evaluated via a billable video visit.        Lionel was seen today for office visit.    Diagnoses and all orders for this visit:    Allergy, initial encounter  Patient reports allergy symptoms including watery eyes, sneezing, congestion for the past 2 years.  Unsure of cause or if it is seasonal, although symptoms seem to occur more in the evening or at night when patient is home.  Has been using OTC antihistamine with temporary relief.  Discussed with patient that he can continue using daily antihistamine, add Flonase for congestion.  Could also consider getting rid of as much dust in the house or using HEPA filters.  Could also consider seeing allergy if this is not improved.  Patient was agreeable.  -     Adult Allergy/Asthma  Referral; Future          Subjective   Lionel is a 43 year old, presenting for the following health issues:  office visit (Allergies issues)        9/30/2024    11:54 AM   Additional Questions   Roomed by    Accompanied by self     History of Present Illness       Reason for visit:  Allergies   He is taking medications regularly.     Pt reports that he has taken everything, OTC antihistamines for over a month.   Hard to sleep.   Sneezing and watery eyes  Has been new for him the last couple of years, not sure if it is the season  Flares up at night, evening more.         Objective           Vitals:  No vitals were obtained today due to virtual visit.    Physical Exam   GENERAL: alert and no distress  EYES: Eyes grossly normal to inspection.  No discharge or erythema, or obvious scleral/conjunctival abnormalities.  RESP: No audible wheeze, cough, or visible cyanosis.    SKIN: Visible skin clear. No significant rash, abnormal pigmentation or lesions.  NEURO: Cranial nerves grossly intact.  Mentation and speech appropriate for age.  PSYCH: Appropriate affect, tone, and pace of words          Video-Visit Details    Type of service:  Video Visit    Originating Location (pt. Location): Home    Distant Location (provider location):  On-site  Platform used for Video Visit: Yuliya  Signed Electronically by: Lyric Alvarado MD

## 2024-10-01 NOTE — TELEPHONE ENCOUNTER
Writer replied to patient via [employer].     Logan PENA RN  [facility]

## 2024-12-24 DIAGNOSIS — J45.909 MODERATE ASTHMA WITHOUT COMPLICATION, UNSPECIFIED WHETHER PERSISTENT: Primary | ICD-10-CM

## 2024-12-24 DIAGNOSIS — Z76.0 ENCOUNTER FOR MEDICATION REFILL: ICD-10-CM

## 2024-12-24 RX ORDER — ALBUTEROL SULFATE 90 UG/1
2 INHALANT RESPIRATORY (INHALATION) EVERY 6 HOURS PRN
Qty: 8.5 G | Refills: 11 | Status: SHIPPED | OUTPATIENT
Start: 2024-12-24

## 2025-02-17 SDOH — HEALTH STABILITY: PHYSICAL HEALTH: ON AVERAGE, HOW MANY MINUTES DO YOU ENGAGE IN EXERCISE AT THIS LEVEL?: PATIENT DECLINED

## 2025-02-17 SDOH — HEALTH STABILITY: PHYSICAL HEALTH: ON AVERAGE, HOW MANY DAYS PER WEEK DO YOU ENGAGE IN MODERATE TO STRENUOUS EXERCISE (LIKE A BRISK WALK)?: 2 DAYS

## 2025-02-17 ASSESSMENT — SOCIAL DETERMINANTS OF HEALTH (SDOH): HOW OFTEN DO YOU GET TOGETHER WITH FRIENDS OR RELATIVES?: TWICE A WEEK

## 2025-02-18 ENCOUNTER — OFFICE VISIT (OUTPATIENT)
Dept: FAMILY MEDICINE | Facility: CLINIC | Age: 45
End: 2025-02-18
Payer: COMMERCIAL

## 2025-02-18 VITALS
HEART RATE: 72 BPM | OXYGEN SATURATION: 100 % | TEMPERATURE: 97.6 F | WEIGHT: 315 LBS | BODY MASS INDEX: 41.75 KG/M2 | RESPIRATION RATE: 20 BRPM | SYSTOLIC BLOOD PRESSURE: 104 MMHG | DIASTOLIC BLOOD PRESSURE: 65 MMHG | HEIGHT: 73 IN

## 2025-02-18 DIAGNOSIS — E55.9 VITAMIN D DEFICIENCY: ICD-10-CM

## 2025-02-18 DIAGNOSIS — E66.01 CLASS 3 SEVERE OBESITY DUE TO EXCESS CALORIES WITH SERIOUS COMORBIDITY AND BODY MASS INDEX (BMI) OF 45.0 TO 49.9 IN ADULT (H): ICD-10-CM

## 2025-02-18 DIAGNOSIS — I10 ESSENTIAL HYPERTENSION: ICD-10-CM

## 2025-02-18 DIAGNOSIS — J45.40 MODERATE PERSISTENT ASTHMA WITHOUT COMPLICATION: ICD-10-CM

## 2025-02-18 DIAGNOSIS — R73.01 IMPAIRED FASTING BLOOD SUGAR: ICD-10-CM

## 2025-02-18 DIAGNOSIS — E66.813 CLASS 3 SEVERE OBESITY DUE TO EXCESS CALORIES WITH SERIOUS COMORBIDITY AND BODY MASS INDEX (BMI) OF 45.0 TO 49.9 IN ADULT (H): ICD-10-CM

## 2025-02-18 DIAGNOSIS — Z00.00 ROUTINE GENERAL MEDICAL EXAMINATION AT A HEALTH CARE FACILITY: Primary | ICD-10-CM

## 2025-02-18 LAB
ANION GAP SERPL CALCULATED.3IONS-SCNC: 12 MMOL/L (ref 7–15)
BUN SERPL-MCNC: 11.5 MG/DL (ref 6–20)
CALCIUM SERPL-MCNC: 10 MG/DL (ref 8.8–10.4)
CHLORIDE SERPL-SCNC: 102 MMOL/L (ref 98–107)
CREAT SERPL-MCNC: 1.07 MG/DL (ref 0.67–1.17)
EGFRCR SERPLBLD CKD-EPI 2021: 89 ML/MIN/1.73M2
EST. AVERAGE GLUCOSE BLD GHB EST-MCNC: 128 MG/DL
GLUCOSE SERPL-MCNC: 107 MG/DL (ref 70–99)
HBA1C MFR BLD: 6.1 % (ref 0–5.6)
HCO3 SERPL-SCNC: 27 MMOL/L (ref 22–29)
POTASSIUM SERPL-SCNC: 4 MMOL/L (ref 3.4–5.3)
SODIUM SERPL-SCNC: 141 MMOL/L (ref 135–145)
VIT D+METAB SERPL-MCNC: 17 NG/ML (ref 20–50)

## 2025-02-18 PROCEDURE — 99396 PREV VISIT EST AGE 40-64: CPT | Mod: 25 | Performed by: FAMILY MEDICINE

## 2025-02-18 PROCEDURE — 82306 VITAMIN D 25 HYDROXY: CPT | Performed by: FAMILY MEDICINE

## 2025-02-18 PROCEDURE — 36415 COLL VENOUS BLD VENIPUNCTURE: CPT | Performed by: FAMILY MEDICINE

## 2025-02-18 PROCEDURE — 90715 TDAP VACCINE 7 YRS/> IM: CPT | Performed by: FAMILY MEDICINE

## 2025-02-18 PROCEDURE — 90471 IMMUNIZATION ADMIN: CPT | Performed by: FAMILY MEDICINE

## 2025-02-18 PROCEDURE — 83036 HEMOGLOBIN GLYCOSYLATED A1C: CPT | Performed by: FAMILY MEDICINE

## 2025-02-18 PROCEDURE — 80048 BASIC METABOLIC PNL TOTAL CA: CPT | Performed by: FAMILY MEDICINE

## 2025-02-18 PROCEDURE — 99214 OFFICE O/P EST MOD 30 MIN: CPT | Mod: 25 | Performed by: FAMILY MEDICINE

## 2025-02-18 RX ORDER — AMLODIPINE BESYLATE 5 MG/1
5 TABLET ORAL DAILY
Qty: 90 TABLET | Refills: 1 | Status: SHIPPED | OUTPATIENT
Start: 2025-02-18

## 2025-02-18 RX ORDER — TIRZEPATIDE 2.5 MG/.5ML
2.5 INJECTION, SOLUTION SUBCUTANEOUS
Qty: 2 ML | Refills: 0 | Status: SHIPPED | OUTPATIENT
Start: 2025-02-18 | End: 2025-03-18

## 2025-02-18 NOTE — PROGRESS NOTES
"Preventive Care Visit  [facility]  Tashi Sin MD, Family Medicine  Feb 18, 2025      Assessment & Plan     Routine general medical examination at a health care facility    Essential hypertension   Very well controlled.  Step back to 5 mg amlodipine.  Monitor pressures and symptoms.  - BASIC METABOLIC PANEL  - amLODIPine (NORVASC) 5 MG tablet  Dispense: 90 tablet; Refill: 1  - Home Blood Pressure Monitor Order  - Hemoglobin A1c    Moderate persistent asthma without complication  Continue as needed ROCÍO    Impaired fasting blood sugar  Not in DM range.  GLP1 and weight loss    Class 3 severe obesity due to excess calories with serious comorbidity and body mass index (BMI) of 45.0 to 49.9 in adult (H)  Discussed weight loss with GLP-1's.  Discussed potential complications.  Reasonable to try to obtain this and see what insurance coverage and prices are like.  - ZEPBOUND 2.5 MG/0.5ML prefilled pen  Dispense: 2 mL; Refill: 0  - tirzepatide-Weight Management (ZEPBOUND) 5 MG/0.5ML prefilled pen  Dispense: 2 mL; Refill: 0    Vitamin D deficiency  Recheck levels.  - Vitamin D Deficiency      Patient has been advised of split billing requirements and indicates understanding: Yes        BMI  Estimated body mass index is 47.5 kg/m  as calculated from the following:    Height as of this encounter: 1.854 m (6' 1\").    Weight as of this encounter: 163.3 kg (360 lb).     Counseling  Appropriate preventive services were addressed with this patient via screening, questionnaire, or discussion as appropriate for fall prevention, nutrition, physical activity, Tobacco-use cessation, social engagement, weight loss and cognition.  Checklist reviewing preventive services available has been given to the patient.  Reviewed patient's diet, addressing concerns and/or questions.   He is at risk for lack of exercise and has been provided with information to increase physical activity for the benefit of his " well-being.   The patient was instructed to see the dentist every 6 months.   He is at risk for psychosocial distress and has been provided with information to reduce risk.     He decline vaccines for covid, flu, pneumonia      Subjective   Lionel is a 44 year old, presenting for the following:  Physical and Recheck Medication        2/18/2025     6:55 AM   Additional Questions   Roomed by rudy   Accompanied by self          HPI    Staying fairly busy.  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    Bp looks very good today.  Maybe getting some light headed episodes if he stands quickly.  Worse if he has not had regular meal.    Interested in weight loss.  Qustions about GLP1.  No family or personal hx of thyroid ca or MEN.      Hypertension Follow-up    Do you check your blood pressure regularly outside of the clinic? No     Asthma Follow-Up    Was ACT completed today?  Yes        9/30/2024    11:57 AM   ACT Total Scores   ACT TOTAL SCORE (Goal Greater than or Equal to 20) 24   In the past 12 months, how many times did you visit the emergency room for your asthma without being admitted to the hospital? 0    In the past 12 months, how many times were you hospitalized overnight because of your asthma? 0        Proxy-reported        How many days per week do you miss taking your asthma controller medication?  I do not have an asthma controller medication  Have you had any Emergency Room Visits, Urgent Care Visits, or Hospital Admissions since your last office visit?  No    Health Care Directive  Patient does not have a Health Care Directive: Discussed advance care planning with patient; however, patient declined at this time.        2/17/2025   General Health   How would you rate your overall physical health? Good   Feel stress (tense, anxious, or unable to sleep) Rather much   (!) STRESS CONCERN      2/17/2025   Nutrition   Three or more servings of calcium each day? (!) I DON'T KNOW   Diet:  Regular (no restrictions)   How many servings of fruit and vegetables per day? (!) I DON'T KNOW   How many sweetened beverages each day? (!) 2         2/17/2025   Exercise   Days per week of moderate/strenous exercise 2 days   Average minutes spent exercising at this level Patient declined   (!) EXERCISE CONCERN      2/17/2025   Social Factors   Frequency of gathering with friends or relatives Twice a week   Worry food won't last until get money to buy more No   Food not last or not have enough money for food? No   Do you have housing? (Housing is defined as stable permanent housing and does not include staying ouside in a car, in a tent, in an abandoned building, in an overnight shelter, or couch-surfing.) Yes   Are you worried about losing your housing? No   Lack of transportation? No   Unable to get utilities (heat,electricity)? No         2/17/2025   Dental   Dentist two times every year? (!) NO         2/29/2024   TB Screening   Were you born outside of the [place]? No         Today's PHQ-2 Score:       2/17/2025     7:52 PM   PHQ-2 ( 1999 [employer])   Q1: Little interest or pleasure in doing things 1   Q2: Feeling down, depressed or hopeless 1   PHQ-2 Score 2    Q1: Little interest or pleasure in doing things Several days   Q2: Feeling down, depressed or hopeless Several days   PHQ-2 Score 2       Patient-reported           2/17/2025   Substance Use   Alcohol more than 3/day or more than 7/wk Not Applicable   Do you use any other substances recreationally? No     Social History     Tobacco Use    Smoking status: Never     Passive exposure: Never    Smokeless tobacco: Never   Vaping Use    Vaping status: Never Used   Substance Use Topics    Alcohol use: Not Currently    Drug use: Never           2/17/2025   STI Screening   New sexual partner(s) since last STI/HIV test? No   ASCVD Risk   The 10-year ASCVD risk score (Joie GIMENEZ, et al., 2019) is: 3.9%    Values used to calculate the score:      Age: 44 years       Sex: Male      Is Non- : Yes      Diabetic: No      Tobacco smoker: No      Systolic Blood Pressure: 104 mmHg      Is BP treated: Yes      HDL Cholesterol: 46 mg/dL      Total Cholesterol: 177 mg/dL       Reviewed and updated as needed this visit by Provider   Tobacco  Allergies  Meds  Problems  Med Hx  Surg Hx  Fam Hx            Past Medical History:   Diagnosis Date    Hypertension     Uncomplicated asthma      Past Surgical History:   Procedure Laterality Date    BIOPSY  1993    IMPLANT SHUNT VENTRICULOPERITONEAL  05/14/1993    VASECTOMY       BP Readings from Last 3 Encounters:   02/18/25 104/65   03/15/24 136/76   02/29/24 124/88    Wt Readings from Last 3 Encounters:   02/18/25 (!) 163.3 kg (360 lb)   02/29/24 (!) 161.9 kg (357 lb)   02/27/24 (!) 165.2 kg (364 lb 2 oz)                  Patient Active Problem List   Diagnosis    Class 3 severe obesity due to excess calories with serious comorbidity and body mass index (BMI) of 45.0 to 49.9 in adult (H)    Primary insomnia    Brain tumor (benign) (H)    Essential hypertension    Impaired fasting blood sugar    Lesion of basal ganglia    History of brain shunt    Adjustment reaction    Moderate asthma without complication    MANAN (obstructive sleep apnea)     Past Surgical History:   Procedure Laterality Date    BIOPSY  1993    IMPLANT SHUNT VENTRICULOPERITONEAL  05/14/1993    VASECTOMY         Social History     Tobacco Use    Smoking status: Never     Passive exposure: Never    Smokeless tobacco: Never   Substance Use Topics    Alcohol use: Not Currently     Family History   Problem Relation Age of Onset    Diabetes Type 2  Maternal Grandmother     Diabetes Maternal Grandmother     Hypertension Maternal Grandmother     Depression Maternal Grandmother     Obesity Mother          Current Outpatient Medications   Medication Sig Dispense Refill    albuterol (PROAIR HFA/PROVENTIL HFA/VENTOLIN HFA) 108 (90 Base) MCG/ACT inhaler INHALE 2  "PUFFS INTO THE LUNGS EVERY 6 HOURS AS NEEDED FOR SHORTNESS OF BREATH / DYSPNEA OR WHEEZING 8.5 g 11    amLODIPine (NORVASC) 5 MG tablet Take 1 tablet (5 mg) by mouth daily. 90 tablet 1    losartan-hydrochlorothiazide (HYZAAR) 100-25 MG tablet Take 1 tablet by mouth daily 90 tablet 3    MELATONIN GUMMIES PO       tirzepatide-Weight Management (ZEPBOUND) 5 MG/0.5ML prefilled pen Inject 0.5 mLs (5 mg) subcutaneously every 7 days for 28 days. 2 mL 0    vitamin D3 (CHOLECALCIFEROL) 50 mcg (2000 units) tablet Take 1 tablet (50 mcg) by mouth daily 90 tablet 01    ZEPBOUND 2.5 MG/0.5ML prefilled pen Inject 0.5 mLs (2.5 mg) subcutaneously every 7 days for 28 days. 2 mL 0     Allergies   Allergen Reactions    Lisinopril Other (See Comments)     Coughing      Recent Labs   Lab Test 02/18/25  0745 02/29/24  0949 02/13/23  1104 10/14/22  0839 01/27/22  0906 03/10/21  0808 01/22/21  2330   A1C 6.1* 6.2* 6.1* 6.1*   < > 6.0*  --    LDL  --  118* 97 97   < > 102  --    HDL  --  46 45 47   < > 45  --    TRIG  --  66 58 54   < > 54  --    ALT  --   --   --  23  --   --  51   CR  --  1.20* 1.03 1.00   < > 0.95 1.12   GFRESTIMATED  --  78 >90 >90   < > >60 83   GFRESTBLACK  --   --   --   --   --  >60 >90   POTASSIUM  --  4.1 4.0 4.0   < > 4.0 2.9*   TSH  --  2.04  --  2.15  --  1.27  --     < > = values in this interval not displayed.         Objective    Exam  /65 (BP Location: Left arm, Patient Position: Sitting, Cuff Size: Adult Large)   Pulse 72   Temp 97.6  F (36.4  C) (Temporal)   Resp 20   Ht 1.854 m (6' 1\")   Wt (!) 163.3 kg (360 lb)   SpO2 100%   BMI 47.50 kg/m     Estimated body mass index is 47.5 kg/m  as calculated from the following:    Height as of this encounter: 1.854 m (6' 1\").    Weight as of this encounter: 163.3 kg (360 lb).    Physical Exam  Gen:   Alert, not distressed  Head:   Normocephalic, without obvious abnormality, atraumatic  Eyes:   PERRL, conjunctiva/corneas clear, EOM's intact  Ears: "   Normal tympanic membranes and external ear canals  Nose:    Mucosa normal, no drainage or sinus tenderness  Throat:    No erythema or exudates  Neck:    No adenopathy no nodules in thyroid, normal ROM  Lungs:    Clear to auscultation bilaterally, respirations unlabored  Chest wall:  No tenderness or deformity  Heart:     Regular, normal S1 and S2, no murmur, gallop or rub  Abdomen:  Soft, non-tender, normal bowel sounds, no masses, no organomegaly  Back:    Symmetric, no curvature, ROM normal, no CVA tenderness  Extremities:   Extremities normal, atraumatic, no cyanosis or edema  Skin:     Skin color, texture, turgor normal, no rashes or lesions  Lymph nodes:   Cervical and supraclavicular nodes normal  Neurologic:   CNII-XII intact.   DTRs normal and symmetric.  Symmetric strength and sensation.          Signed Electronically by: Tashi Sin MD  DME (Durable Medical Equipment) Orders and Documentation  Orders Placed This Encounter   Procedures    Home Blood Pressure Monitor Order        The patient was assessed and it was determined the patient is in need of the following listed DME Supplies/Equipment. Please complete supporting documentation below to demonstrate medical necessity.

## 2025-02-18 NOTE — PATIENT INSTRUCTIONS
Patient Education   Preventive Care Advice   This is general advice given by our system to help you stay healthy. However, your care team may have specific advice just for you. Please talk to your care team about your preventive care needs.  Nutrition  Eat 5 or more servings of fruits and vegetables each day.  Try wheat bread, brown rice and whole grain pasta (instead of white bread, rice, and pasta).  Get enough calcium and vitamin D. Check the label on foods and aim for 100% of the RDA (recommended daily allowance).  Lifestyle  Exercise at least 150 minutes each week  (30 minutes a day, 5 days a week).  Do muscle strengthening activities 2 days a week. These help control your weight and prevent disease.  No smoking.  Wear sunscreen to prevent skin cancer.  Have a dental exam and cleaning every 6 months.  Yearly exams  See your health care team every year to talk about:  Any changes in your health.  Any medicines your care team has prescribed.  Preventive care, family planning, and ways to prevent chronic diseases.  Shots (vaccines)   HPV shots (up to age 26), if you've never had them before.  Hepatitis B shots (up to age 59), if you've never had them before.  COVID-19 shot: Get this shot when it's due.  Flu shot: Get a flu shot every year.  Tetanus shot: Get a tetanus shot every 10 years.  Pneumococcal, hepatitis A, and RSV shots: Ask your care team if you need these based on your risk.  Shingles shot (for age 50 and up)  General health tests  Diabetes screening:  Starting at age 35, Get screened for diabetes at least every 3 years.  If you are younger than age 35, ask your care team if you should be screened for diabetes.  Cholesterol test: At age 39, start having a cholesterol test every 5 years, or more often if advised.  Bone density scan (DEXA): At age 50, ask your care team if you should have this scan for osteoporosis (brittle bones).  Hepatitis C: Get tested at least once in your life.  STIs (sexually  transmitted infections)  Before age 24: Ask your care team if you should be screened for STIs.  After age 24: Get screened for STIs if you're at risk. You are at risk for STIs (including HIV) if:  You are sexually active with more than one person.  You don't use condoms every time.  You or a partner was diagnosed with a sexually transmitted infection.  If you are at risk for HIV, ask about PrEP medicine to prevent HIV.  Get tested for HIV at least once in your life, whether you are at risk for HIV or not.  Cancer screening tests  Cervical cancer screening: If you have a cervix, begin getting regular cervical cancer screening tests starting at age 21.  Breast cancer scan (mammogram): If you've ever had breasts, begin having regular mammograms starting at age 40. This is a scan to check for breast cancer.  Colon cancer screening: It is important to start screening for colon cancer at age 45.  Have a colonoscopy test every 10 years (or more often if you're at risk) Or, ask your provider about stool tests like a FIT test every year or Cologuard test every 3 years.  To learn more about your testing options, visit:   .  For help making a decision, visit:   https://bit.ly/zn25520.  Prostate cancer screening test: If you have a prostate, ask your care team if a prostate cancer screening test (PSA) at age 55 is right for you.  Lung cancer screening: If you are a current or former smoker ages 50 to 80, ask your care team if ongoing lung cancer screenings are right for you.  For informational purposes only. Not to replace the advice of your health care provider. Copyright   2023 [facility]. All rights reserved. Clinically reviewed by the [facility]. [facility] 718256 - REV 01/24.  Learning About Stress  What is stress?     Stress is your body's response to a hard situation. Your body can have a physical, emotional, or mental response. Stress is a fact of life for most people, and it  affects everyone differently. What causes stress for you may not be stressful for someone else.  A lot of things can cause stress. You may feel stress when you go on a job interview, take a test, or run a race. This kind of short-term stress is normal and even useful. It can help you if you need to work hard or react quickly. For example, stress can help you finish an important job on time.  Long-term stress is caused by ongoing stressful situations or events. Examples of long-term stress include long-term health problems, ongoing problems at work, or conflicts in your family. Long-term stress can harm your health.  How does stress affect your health?  When you are stressed, your body responds as though you are in danger. It makes hormones that speed up your heart, make you breathe faster, and give you a burst of energy. This is called the fight-or-flight stress response. If the stress is over quickly, your body goes back to normal and no harm is done.  But if stress happens too often or lasts too long, it can have bad effects. Long-term stress can make you more likely to get sick, and it can make symptoms of some diseases worse. If you tense up when you are stressed, you may develop neck, shoulder, or low back pain. Stress is linked to high blood pressure and heart disease.  Stress also harms your emotional health. It can make you granados, tense, or depressed. Your relationships may suffer, and you may not do well at work or school.  What can you do to manage stress?  You can try these things to help manage stress:   Do something active. Exercise or activity can help reduce stress. Walking is a great way to get started. Even everyday activities such as housecleaning or yard work can help.  Try yoga or katey chi. These techniques combine exercise and meditation. You may need some training at first to learn them.  Do something you enjoy. For example, listen to music or go to a movie. Practice your hobby or do volunteer  "work.  Meditate. This can help you relax, because you are not worrying about what happened before or what may happen in the future.  Do guided imagery. Imagine yourself in any setting that helps you feel calm. You can use online videos, books, or a teacher to guide you.  Do breathing exercises. For example:  From a standing position, bend forward from the waist with your knees slightly bent. Let your arms dangle close to the floor.  Breathe in slowly and deeply as you return to a standing position. Roll up slowly and lift your head last.  Hold your breath for just a few seconds in the standing position.  Breathe out slowly and bend forward from the waist.  Let your feelings out. Talk, laugh, cry, and express anger when you need to. Talking with supportive friends or family, a counselor, or a ericka leader about your feelings is a healthy way to relieve stress. Avoid discussing your feelings with people who make you feel worse.  Write. It may help to write about things that are bothering you. This helps you find out how much stress you feel and what is causing it. When you know this, you can find better ways to cope.  What can you do to prevent stress?  You might try some of these things to help prevent stress:  Manage your time. This helps you find time to do the things you want and need to do.  Get enough sleep. Your body recovers from the stresses of the day while you are sleeping.  Get support. Your family, friends, and community can make a difference in how you experience stress.  Limit your news feed. Avoid or limit time on social media or news that may make you feel stressed.  Do something active. Exercise or activity can help reduce stress. Walking is a great way to get started.  Where can you learn more?  Go to https://www.[facility].net/patiented  Enter N032 in the search box to learn more about \"Learning About Stress.\"  Current as of: October 24, 2023  Content Version: 14.3    2024 [employer]. "   Care instructions adapted under license by your healthcare professional. If you have questions about a medical condition or this instruction, always ask your healthcare professional. [employer] disclaims any warranty or liability for your use of this information.

## 2025-02-21 ENCOUNTER — TELEPHONE (OUTPATIENT)
Dept: FAMILY MEDICINE | Facility: CLINIC | Age: 45
End: 2025-02-21
Payer: COMMERCIAL

## 2025-02-21 NOTE — TELEPHONE ENCOUNTER
Please start a PA for ZEPBOUND 2.5 MG/0.5ML prefilled pen.     Thanks,  Arianne Molina RN BSN  [facility]

## 2025-02-25 NOTE — TELEPHONE ENCOUNTER
Retail Pharmacy Prior Authorization Team   Phone: [phone]    Prior Authorization Approval    Medication: ZEPBOUND 2.5 MG/0.5ML SC SOAJ  Authorization Effective Date: 1/26/2025  Authorization Expiration Date: 2/25/2026  Insurance Company: [employer] - Phone [phone] Fax [phone]  Which Pharmacy is filling the prescription: [facility] - [place], [place] - [address]  Pharmacy Notified: yes  Patient Notified: **Instructed pharmacy to notify patient when script is ready to /ship.**

## 2025-02-25 NOTE — TELEPHONE ENCOUNTER
Retail Pharmacy Prior Authorization Team   Phone: [phone]    PA Initiation    Medication: ZEPBOUND 2.5 MG/0.5ML SC SOAJ  Insurance Company: [employer] - Phone [phone] Fax [phone]  Pharmacy Filling the Rx: [facility] - [place], [place] - [address] [place] [address]  Filling Pharmacy Phone: [phone]  Filling Pharmacy Fax:    Start Date: 2/25/2025

## 2025-03-04 DIAGNOSIS — I10 ESSENTIAL HYPERTENSION: ICD-10-CM

## 2025-03-04 RX ORDER — LOSARTAN POTASSIUM AND HYDROCHLOROTHIAZIDE 25; 100 MG/1; MG/1
1 TABLET ORAL DAILY
Qty: 90 TABLET | Refills: 2 | Status: SHIPPED | OUTPATIENT
Start: 2025-03-04

## 2025-03-04 NOTE — TELEPHONE ENCOUNTER
Medication Question or Refill        What medication are you calling about (include dose and sig)?: losartan-hydrochlorothiazide (HYZAAR) 100-25 MG tablet     Preferred Pharmacy:   [facility] - [place], [place] - [address]  [place] [address]  Phone: [phone] Fax: [phone]      Controlled Substance Agreement on file:   CSA -- Patient Level:    CSA: None found at the patient level.       Who prescribed the medication?: PCP    Do you need a refill? Yes    When did you use the medication last? NA    Patient offered an appointment? No    Do you have any questions or concerns?  No      Could we send this information to you in [place] or would you prefer to receive a phone call?:   Patient would prefer a phone call   Okay to leave a detailed message?: Yes at Home number on file [phone] (home)